# Patient Record
Sex: MALE | Race: WHITE | NOT HISPANIC OR LATINO | Employment: OTHER | URBAN - METROPOLITAN AREA
[De-identification: names, ages, dates, MRNs, and addresses within clinical notes are randomized per-mention and may not be internally consistent; named-entity substitution may affect disease eponyms.]

---

## 2017-02-13 ENCOUNTER — APPOINTMENT (OUTPATIENT)
Dept: RADIOLOGY | Facility: MEDICAL CENTER | Age: 77
End: 2017-02-13
Attending: EMERGENCY MEDICINE
Payer: MEDICARE

## 2017-02-13 ENCOUNTER — HOSPITAL ENCOUNTER (EMERGENCY)
Facility: MEDICAL CENTER | Age: 77
End: 2017-02-13
Attending: EMERGENCY MEDICINE
Payer: MEDICARE

## 2017-02-13 VITALS
HEART RATE: 85 BPM | BODY MASS INDEX: 35.06 KG/M2 | TEMPERATURE: 98.6 F | HEIGHT: 73 IN | WEIGHT: 264.55 LBS | OXYGEN SATURATION: 93 % | SYSTOLIC BLOOD PRESSURE: 118 MMHG | RESPIRATION RATE: 18 BRPM | DIASTOLIC BLOOD PRESSURE: 55 MMHG

## 2017-02-13 DIAGNOSIS — N20.0 NEPHROLITHIASIS: ICD-10-CM

## 2017-02-13 DIAGNOSIS — N39.0 URINARY TRACT INFECTION WITH HEMATURIA, SITE UNSPECIFIED: ICD-10-CM

## 2017-02-13 DIAGNOSIS — R31.9 URINARY TRACT INFECTION WITH HEMATURIA, SITE UNSPECIFIED: ICD-10-CM

## 2017-02-13 LAB
ALBUMIN SERPL BCP-MCNC: 3 G/DL (ref 3.2–4.9)
ALBUMIN/GLOB SERPL: 1.1 G/DL
ALP SERPL-CCNC: 65 U/L (ref 30–99)
ALT SERPL-CCNC: 12 U/L (ref 2–50)
ANION GAP SERPL CALC-SCNC: 11 MMOL/L (ref 0–11.9)
APPEARANCE UR: ABNORMAL
AST SERPL-CCNC: 17 U/L (ref 12–45)
BACTERIA #/AREA URNS HPF: ABNORMAL /HPF
BASOPHILS # BLD AUTO: 0.9 % (ref 0–1.8)
BASOPHILS # BLD: 0.09 K/UL (ref 0–0.12)
BILIRUB SERPL-MCNC: 0.9 MG/DL (ref 0.1–1.5)
BILIRUB UR QL STRIP.AUTO: NEGATIVE
BUN SERPL-MCNC: 16 MG/DL (ref 8–22)
CALCIUM SERPL-MCNC: 8.4 MG/DL (ref 8.4–10.2)
CHLORIDE SERPL-SCNC: 99 MMOL/L (ref 96–112)
CO2 SERPL-SCNC: 26 MMOL/L (ref 20–33)
COLOR UR: YELLOW
CREAT SERPL-MCNC: 1.48 MG/DL (ref 0.5–1.4)
CULTURE IF INDICATED INDCX: YES UA CULTURE
EOSINOPHIL # BLD AUTO: 0.13 K/UL (ref 0–0.51)
EOSINOPHIL NFR BLD: 1.4 % (ref 0–6.9)
ERYTHROCYTE [DISTWIDTH] IN BLOOD BY AUTOMATED COUNT: 50 FL (ref 35.9–50)
GFR SERPL CREATININE-BSD FRML MDRD: 46 ML/MIN/1.73 M 2
GLOBULIN SER CALC-MCNC: 2.8 G/DL (ref 1.9–3.5)
GLUCOSE SERPL-MCNC: 108 MG/DL (ref 65–99)
GLUCOSE UR STRIP.AUTO-MCNC: NEGATIVE MG/DL
HCT VFR BLD AUTO: 50 % (ref 42–52)
HGB BLD-MCNC: 16.7 G/DL (ref 14–18)
IMM GRANULOCYTES # BLD AUTO: 0.06 K/UL (ref 0–0.11)
IMM GRANULOCYTES NFR BLD AUTO: 0.6 % (ref 0–0.9)
KETONES UR STRIP.AUTO-MCNC: NEGATIVE MG/DL
LEUKOCYTE ESTERASE UR QL STRIP.AUTO: ABNORMAL
LIPASE SERPL-CCNC: 42 U/L (ref 7–58)
LYMPHOCYTES # BLD AUTO: 1.43 K/UL (ref 1–4.8)
LYMPHOCYTES NFR BLD: 14.9 % (ref 22–41)
MCH RBC QN AUTO: 26.8 PG (ref 27–33)
MCHC RBC AUTO-ENTMCNC: 33.4 G/DL (ref 33.7–35.3)
MCV RBC AUTO: 80.3 FL (ref 81.4–97.8)
MICRO URNS: ABNORMAL
MONOCYTES # BLD AUTO: 1.27 K/UL (ref 0–0.85)
MONOCYTES NFR BLD AUTO: 13.3 % (ref 0–13.4)
MUCOUS THREADS #/AREA URNS HPF: ABNORMAL /HPF
NEUTROPHILS # BLD AUTO: 6.59 K/UL (ref 1.82–7.42)
NEUTROPHILS NFR BLD: 68.9 % (ref 44–72)
NITRITE UR QL STRIP.AUTO: NEGATIVE
NRBC # BLD AUTO: 0 K/UL
NRBC BLD AUTO-RTO: 0 /100 WBC
PH UR STRIP.AUTO: 8 [PH]
PLATELET # BLD AUTO: 246 K/UL (ref 164–446)
PMV BLD AUTO: 9.3 FL (ref 9–12.9)
POTASSIUM SERPL-SCNC: 3.4 MMOL/L (ref 3.6–5.5)
PROT SERPL-MCNC: 5.8 G/DL (ref 6–8.2)
PROT UR QL STRIP: NEGATIVE MG/DL
RBC # BLD AUTO: 6.23 M/UL (ref 4.7–6.1)
RBC # URNS HPF: ABNORMAL /HPF
RBC UR QL AUTO: ABNORMAL
SODIUM SERPL-SCNC: 136 MMOL/L (ref 135–145)
SP GR UR STRIP.AUTO: 1.01
UNIDENT CRYS URNS QL MICRO: ABNORMAL /HPF
WBC # BLD AUTO: 9.6 K/UL (ref 4.8–10.8)
WBC #/AREA URNS HPF: ABNORMAL /HPF

## 2017-02-13 PROCEDURE — 87086 URINE CULTURE/COLONY COUNT: CPT

## 2017-02-13 PROCEDURE — 74177 CT ABD & PELVIS W/CONTRAST: CPT

## 2017-02-13 PROCEDURE — 87077 CULTURE AEROBIC IDENTIFY: CPT

## 2017-02-13 PROCEDURE — 83690 ASSAY OF LIPASE: CPT

## 2017-02-13 PROCEDURE — 700117 HCHG RX CONTRAST REV CODE 255: Performed by: EMERGENCY MEDICINE

## 2017-02-13 PROCEDURE — 87186 SC STD MICRODIL/AGAR DIL: CPT

## 2017-02-13 PROCEDURE — 96375 TX/PRO/DX INJ NEW DRUG ADDON: CPT

## 2017-02-13 PROCEDURE — 85025 COMPLETE CBC W/AUTO DIFF WBC: CPT

## 2017-02-13 PROCEDURE — 304561 HCHG STAT O2

## 2017-02-13 PROCEDURE — 99285 EMERGENCY DEPT VISIT HI MDM: CPT

## 2017-02-13 PROCEDURE — 96365 THER/PROPH/DIAG IV INF INIT: CPT

## 2017-02-13 PROCEDURE — 81001 URINALYSIS AUTO W/SCOPE: CPT

## 2017-02-13 PROCEDURE — 80053 COMPREHEN METABOLIC PANEL: CPT

## 2017-02-13 PROCEDURE — 36415 COLL VENOUS BLD VENIPUNCTURE: CPT

## 2017-02-13 PROCEDURE — 700111 HCHG RX REV CODE 636 W/ 250 OVERRIDE (IP): Performed by: EMERGENCY MEDICINE

## 2017-02-13 PROCEDURE — 700105 HCHG RX REV CODE 258: Performed by: EMERGENCY MEDICINE

## 2017-02-13 RX ORDER — CEFTRIAXONE 2 G/1
2 INJECTION, POWDER, FOR SOLUTION INTRAMUSCULAR; INTRAVENOUS ONCE
Status: COMPLETED | OUTPATIENT
Start: 2017-02-13 | End: 2017-02-13

## 2017-02-13 RX ORDER — CEFDINIR 300 MG/1
300 CAPSULE ORAL 2 TIMES DAILY
Qty: 20 CAP | Refills: 0 | Status: ON HOLD | OUTPATIENT
Start: 2017-02-13 | End: 2017-11-08

## 2017-02-13 RX ORDER — SODIUM CHLORIDE 9 MG/ML
1000 INJECTION, SOLUTION INTRAVENOUS ONCE
Status: COMPLETED | OUTPATIENT
Start: 2017-02-13 | End: 2017-02-13

## 2017-02-13 RX ORDER — ONDANSETRON 2 MG/ML
4 INJECTION INTRAMUSCULAR; INTRAVENOUS ONCE
Status: COMPLETED | OUTPATIENT
Start: 2017-02-13 | End: 2017-02-13

## 2017-02-13 RX ORDER — ONDANSETRON 4 MG/1
4 TABLET, FILM COATED ORAL EVERY 4 HOURS PRN
Qty: 20 TAB | Refills: 0 | Status: ON HOLD | OUTPATIENT
Start: 2017-02-13 | End: 2017-11-08

## 2017-02-13 RX ADMIN — ONDANSETRON 4 MG: 2 INJECTION, SOLUTION INTRAMUSCULAR; INTRAVENOUS at 16:00

## 2017-02-13 RX ADMIN — IOHEXOL 100 ML: 350 INJECTION, SOLUTION INTRAVENOUS at 17:25

## 2017-02-13 RX ADMIN — CEFTRIAXONE SODIUM 2 G: 2 INJECTION, POWDER, FOR SOLUTION INTRAMUSCULAR; INTRAVENOUS at 18:06

## 2017-02-13 RX ADMIN — SODIUM CHLORIDE 1000 ML: 9 INJECTION, SOLUTION INTRAVENOUS at 16:01

## 2017-02-13 NOTE — ED AVS SNAPSHOT
2/13/2017          Jed Cramer  [address] [facility] [address]    Dear Jed:    [facility] wants to ensure your discharge home is safe and you or your loved ones have had all your questions answered regarding your care after you leave the hospital.    You may receive a telephone call within two days of your discharge.  This call is to make certain you understand your discharge instructions as well as ensure we provided you with the best care possible during your stay with us.     The call will only last approximately 3-5 minutes and will be done by a nurse.    Once again, we want to ensure your discharge home is safe and that you have a clear understanding of any next steps in your care.  If you have any questions or concerns, please do not hesitate to contact us, we are here for you.  Thank you for choosing [facility] for your healthcare needs.    Sincerely,    Brandon England    [facility]

## 2017-02-13 NOTE — ED PROVIDER NOTES
ED Provider Note    CHIEF COMPLAINT  Chief Complaint   Patient presents with   • Abdominal Pain       HPI  Jed Cramer is a 76 y.o. male who presents for evaluation of abrupt onset diffuse abdominal pain mild distention nausea vomiting without diarrhea. He does have a history of bladder cancer, he performs self-catheterization into his neobladder. He denies any vomiting blood and black or bloody stools. No high fevers or chills. He has history of prior appendectomy as well as colitis in the past. He has a pain pump as well. He is followed by urology. No associated hematemesis hematochezia. Currently as 7 out of 10 discomfort primarily epigastric no chest pain no cough or leg swelling. Nothing makes it better or worse    REVIEW OF SYSTEMS  See HPI for further details. No night sweats weight loss numbness tingling or weakness All other systems are negative.     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 5/8/2012   • Personal history of urinary calculi 5/8/2012   • Obesity, unspecified 5/8/2012   • Obstructive sleep apnea (adult) (pediatric) 5/8/2012     unclear if true   • Opioid type dependence, unspecified (CMS-HCC) 5/8/2012   • Leg DVT (deep venous thromboembolism), acute (CMS-HCC) 1980s   • Knee joint injury 1970s     Injury in training for [employer]   • Colitis      Hospitalized 3/2012   • CRPS (complex regional pain syndrome)      Of L leg after surgery 2011   • Chronic pain syndrome      Implanted pain pump managed by Dr Shane Pena   • Depression    • Malignant neoplasm of bladder, part unspecified (CMS-HCC) 2010     Radical cystectomy with prostatectomy, T2N0M0 with  orthotopic neobladder, 2010   • Osteoporosis        FAMILY HISTORY  Noncontributory    SOCIAL HISTORY  Social History     Social History   • Marital Status:      Spouse Name: Mari Cramer   • Number of Children: 3   • Years of Education: N/A     Occupational History   • retired      police   •       / [place]     Social History Main Topics   • Smoking status: Former Smoker -- 1.00 packs/day for 10 years     Quit date: 06/26/2016   • Smokeless tobacco: Current User      Comment: chewing tobacco occ   • Alcohol Use: No   • Drug Use: No   • Sexual Activity: Not Asked     Other Topics Concern   • None     Social History Narrative    , wife Mari    Disability from knee/leg injury    [place] Vet    Retired [employer]     [place] [address]             Former smoker no IV drug use  SURGICAL HISTORY  Past Surgical History   Procedure Laterality Date   • Cystoprostatectomy radical       Orthotopic neobladder construction with Bell 9/2010   • Appendectomy     • Carpal tunnel release     • Tonsillectomy     • Ankle ligament reconstruction       x 4 surgeries   • Rotator cuff repair  9/2003     R side   • Bladder biopsy  2010   • Pump insert/remove  2012     Pain pump   • Athroplasty       23 L  knee surgeries -- 1997 replacement became infected including MRSA, total knee reconstruction done 8/2011 with no infection afterwards but recurrent sx of pain/swelling/redness -- Dr Chrissy Guo Mark [phone] - now followed by Dr Ricardo Mead and Dmitriy Treviño in [place]       CURRENT MEDICATIONS  No current facility-administered medications for this encounter.    Current outpatient prescriptions:   •  denosumab (PROLIA) 60 MG/ML Solution, Inject 60 mg as instructed Once., Disp: , Rfl:   •  aspirin (ASA) 325 MG Tab, Take 325 mg by mouth every 6 hours as needed., Disp: , Rfl:   •  Cholecalciferol (VITAMIN D3) 5000 UNITS Tab, Take 5,000 Units by mouth every day., Disp: 30 Tab, Rfl:   •  duloxetine (CYMBALTA) 60  "MG Cap DR Particles delayed-release capsule, TAKE ONE CAPSULE BY MOUTH DAILY, Disp: 90 Cap, Rfl: 0  •  benazepril (LOTENSIN) 40 MG tablet, Take 1 Tab by mouth every day., Disp: 90 Tab, Rfl: 2  •  ondansetron (ZOFRAN) 4 MG TABS tablet, Take 1 Tab by mouth every four hours as needed for Nausea/Vomiting., Disp: 16 Tab, Rfl: 2  •  sodium bicarbonate (SODIUM BICARBONATE) 650 MG TABS, Take 2 Tabs by mouth 3 times a day., Disp: 120 Tab, Rfl: 3  •  oxycodone immediate-release (ROXICODONE) 5 MG TABS, Take 15 mg by mouth every four hours as needed., Disp: , Rfl:       ALLERGIES  Allergies   Allergen Reactions   • Ciprofloxacin Hcl      Wife states it causes c-diff       PHYSICAL EXAM  VITAL SIGNS: /98 mmHg  Pulse 71  Temp(Src) 37 °C (98.6 °F)  Resp 20  Ht 1.854 m (6' 0.99\")  Wt 120 kg (264 lb 8.8 oz)  BMI 34.91 kg/m2 Room air O2: 95    Constitutional: Patient is uncomfortable  HENT: Normocephalic, Atraumatic, Bilateral external ears normal, Oropharynx moist, No oral exudates, Nose normal.   Eyes: PERRLA, EOMI, Conjunctiva normal, No discharge.   Neck: Normal range of motion, No tenderness, Supple, No stridor.   Lymphatic: No lymphadenopathy noted.   Cardiovascular: Normal heart rate, Normal rhythm, No murmurs, No rubs, No gallops.   Thorax & Lungs: Normal breath sounds, No respiratory distress, No wheezing, No chest tenderness.   Abdomen: Bowel sounds normal, Soft, she is mild tenderness there does appear to be an umbilical hernia which is reducible. Moderate distention is noted. Pain pump noted in the left anterior lower abdominal wall. No rebound guarding or rigidity  Skin: Warm, Dry, No erythema, No rash.   Back: No tenderness, No CVA tenderness.   Extremities: Intact distal pulses, No edema, No tenderness, No cyanosis, No clubbing.   Neurologic: Alert & oriented x 3, Normal motor function, Normal sensory function, No focal deficits noted.   Psychiatric: Affect normal, Judgment normal, Mood normal. "       RADIOLOGY/PROCEDURES  CT-ABDOMEN-PELVIS WITH   Final Result      1.4 cm calculus within the urinary conduit. Mild wall thickening of the conduit is noted. Correlation with urinalysis is recommended.      Bilateral nonobstructing renal calculi. A 6 mm calculus is seen in the right renal pelvis.      Scarring of the upper pole of the right kidney is noted. Bilateral renal cysts.      Hypodense hepatic lesions are indeterminate. Further evaluation can be obtained with ultrasound or hepatic protocol CT.      Colonic diverticulosis. Moderate amount of colonic stool.      Healing fractures of right-sided ribs.               COURSE & MEDICAL DECISION MAKING  Pertinent Labs & Imaging studies reviewed. (See chart for details)  Results for orders placed or performed during the hospital encounter of 02/13/17   CBC WITH DIFFERENTIAL   Result Value Ref Range    WBC 9.6 4.8 - 10.8 K/uL    RBC 6.23 (H) 4.70 - 6.10 M/uL    Hemoglobin 16.7 14.0 - 18.0 g/dL    Hematocrit 50.0 42.0 - 52.0 %    MCV 80.3 (L) 81.4 - 97.8 fL    MCH 26.8 (L) 27.0 - 33.0 pg    MCHC 33.4 (L) 33.7 - 35.3 g/dL    RDW 50.0 35.9 - 50.0 fL    Platelet Count 246 164 - 446 K/uL    MPV 9.3 9.0 - 12.9 fL    Neutrophils-Polys 68.90 44.00 - 72.00 %    Lymphocytes 14.90 (L) 22.00 - 41.00 %    Monocytes 13.30 0.00 - 13.40 %    Eosinophils 1.40 0.00 - 6.90 %    Basophils 0.90 0.00 - 1.80 %    Immature Granulocytes 0.60 0.00 - 0.90 %    Nucleated RBC 0.00 /100 WBC    Neutrophils (Absolute) 6.59 1.82 - 7.42 K/uL    Lymphs (Absolute) 1.43 1.00 - 4.80 K/uL    Monos (Absolute) 1.27 (H) 0.00 - 0.85 K/uL    Eos (Absolute) 0.13 0.00 - 0.51 K/uL    Baso (Absolute) 0.09 0.00 - 0.12 K/uL    Immature Granulocytes (abs) 0.06 0.00 - 0.11 K/uL    NRBC (Absolute) 0.00 K/uL   COMP METABOLIC PANEL   Result Value Ref Range    Sodium 136 135 - 145 mmol/L    Potassium 3.4 (L) 3.6 - 5.5 mmol/L    Chloride 99 96 - 112 mmol/L    Co2 26 20 - 33 mmol/L    Anion Gap 11.0 0.0 - 11.9    Glucose  108 (H) 65 - 99 mg/dL    Bun 16 8 - 22 mg/dL    Creatinine 1.48 (H) 0.50 - 1.40 mg/dL    Calcium 8.4 8.4 - 10.2 mg/dL    AST(SGOT) 17 12 - 45 U/L    ALT(SGPT) 12 2 - 50 U/L    Alkaline Phosphatase 65 30 - 99 U/L    Total Bilirubin 0.9 0.1 - 1.5 mg/dL    Albumin 3.0 (L) 3.2 - 4.9 g/dL    Total Protein 5.8 (L) 6.0 - 8.2 g/dL    Globulin 2.8 1.9 - 3.5 g/dL    A-G Ratio 1.1 g/dL   LIPASE   Result Value Ref Range    Lipase 42 7 - 58 U/L   URINALYSIS,CULTURE IF INDICATED   Result Value Ref Range    Micro Urine Req Microscopic     Color Yellow     Character Sl Cloudy (A)     Specific Gravity 1.010 <1.035    Ph 8.0 5.0-8.0    Glucose Negative Negative mg/dL    Ketones Negative Negative mg/dL    Protein Negative Negative mg/dL    Bilirubin Negative Negative    Nitrite Negative Negative    Leukocyte Esterase Small (A) Negative    Occult Blood Trace (A) Negative    Culture Indicated Yes UA Culture   ESTIMATED GFR   Result Value Ref Range    GFR If  56 (A) >60 mL/min/1.73 m 2    GFR If Non  46 (A) >60 mL/min/1.73 m 2   URINE MICROSCOPIC (W/UA)   Result Value Ref Range    WBC 5-10 (A) /hpf    RBC 0-2 (A) /hpf    Bacteria Rare (A) None /hpf    Mucous Threads Few /hpf    Urine Crystals Few Amorphous /hpf      IV was established. The patient was given IV fluids and nausea medication as well as antibiotics. He does have evidence of likely mild urinary tract infection. No leukocytosis bandemia tachycardia high fever or chills to suggest sepsis. CT scan here demonstrates what appears to be a relatively large stone in his neobladder. It does not appear to be obstructing this could very well accounted for his pain. He does not have any evidence of obstruction here. He is followed by a urologist in [place] Dr. Bell. I will start him on Omnicef he was given IV Rocephin here. I will give him copies of his laboratory studies and CT scan for follow-up with his urologist    FINAL IMPRESSION  1.  Nephrolithiasis  2. UTI         Electronically signed by: John Paul Ag, 2/13/2017 3:17 PM

## 2017-02-13 NOTE — ED AVS SNAPSHOT
Home Care Instructions                                                                                                                Jed Cramer   MRN: [id number]    Department:  [facility], Emergency Dept   Date of Visit:  2/13/2017            [facility], Emergency Dept    [address]    [place] [address]    Phone:  [phone]      You were seen by     John Paul Ag M.D.      Your Diagnosis Was     Urinary tract infection with hematuria, site unspecified     N39.0, R31.9       These are the medications you received during your hospitalization from 02/13/2017 1458 to 02/13/2017 1844     Date/Time Order Dose Route Action    02/13/2017 1601 NS infusion 1,000 mL 1,000 mL Intravenous New Bag    02/13/2017 1600 ondansetron (ZOFRAN) syringe/vial injection 4 mg 4 mg Intravenous Given    02/13/2017 1725 iohexol (OMNIPAQUE) 350 mg/mL 100 mL Intravenous Given    02/13/2017 1806 cefTRIAXone (ROCEPHIN) injection 2 g 2 g Intravenous Given      Follow-up Information     1. Follow up with Shane Bell M.D. In 2 days.    Specialty:  Urology    Why:  As needed, If symptoms worsen    Contact information    [address] [facility] [address]  [phone]        Medication Information     Review all of your home medications and newly ordered medications with your primary doctor and/or pharmacist as soon as possible. Follow medication instructions as directed by your doctor and/or pharmacist.     Please keep your complete medication list with you and share with your physician. Update the information when medications are discontinued, doses are changed, or new medications (including over-the-counter products) are added; and carry medication information at all times in the event of emergency situations.               Medication List      START taking these medications        Instructions    cefdinir 300 MG Caps   Commonly known as:  OMNICEF    Take 1 Cap by mouth 2  times a day.   Dose:  300 mg         ASK your doctor about these medications        Instructions    aspirin 325 MG Tabs   Commonly known as:  ASA    Take 325 mg by mouth every 6 hours as needed.   Dose:  325 mg       benazepril 40 MG tablet   Commonly known as:  LOTENSIN    Take 1 Tab by mouth every day.   Dose:  40 mg       duloxetine 60 MG Cpep delayed-release capsule   Commonly known as:  CYMBALTA    TAKE ONE CAPSULE BY MOUTH DAILY       * ondansetron 4 MG Tabs tablet   What changed:  Another medication with the same name was added. Make sure you understand how and when to take each.   Commonly known as:  ZOFRAN   Ask about: Which instructions should I use?    Take 1 Tab by mouth every four hours as needed for Nausea/Vomiting.   Dose:  4 mg       * ondansetron 4 MG Tabs tablet   What changed:  You were already taking a medication with the same name, and this prescription was added. Make sure you understand how and when to take each.   Commonly known as:  ZOFRAN   Ask about: Which instructions should I use?    Take 1 Tab by mouth every four hours as needed for Nausea/Vomiting.   Dose:  4 mg       oxycodone immediate-release 5 MG Tabs   Commonly known as:  ROXICODONE    Take 15 mg by mouth every four hours as needed.   Dose:  15 mg       PROLIA 60 MG/ML Soln   Generic drug:  denosumab    Inject 60 mg as instructed Once.   Dose:  60 mg       sodium bicarbonate 650 MG Tabs   Commonly known as:  SODIUM BICARBONATE    Take 2 Tabs by mouth 3 times a day.   Dose:  1300 mg       Vitamin D3 5000 UNITS Tabs    Take 5,000 Units by mouth every day.   Dose:  5000 Units       * Notice:  This list has 2 medication(s) that are the same as other medications prescribed for you. Read the directions carefully, and ask your doctor or other care provider to review them with you.            Procedures and tests performed during your visit     CBC WITH DIFFERENTIAL    COMP METABOLIC PANEL    CONSENT FOR CONTRAST INJECTION     CT-ABDOMEN-PELVIS WITH    ESTIMATED GFR    LIPASE    URINALYSIS,CULTURE IF INDICATED    URINE CULTURE(NEW)    URINE MICROSCOPIC (W/UA)        Discharge Instructions       Urinary Tract Infection  Urinary tract infections (UTIs) can develop anywhere along your urinary tract. Your urinary tract is your body's drainage system for removing wastes and extra water. Your urinary tract includes two kidneys, two ureters, a bladder, and a urethra. Your kidneys are a pair of bean-shaped organs. Each kidney is about the size of your fist. They are located below your ribs, one on each side of your spine.  CAUSES  Infections are caused by microbes, which are microscopic organisms, including fungi, viruses, and bacteria. These organisms are so small that they can only be seen through a microscope. Bacteria are the microbes that most commonly cause UTIs.  SYMPTOMS   Symptoms of UTIs may vary by age and gender of the patient and by the location of the infection. Symptoms in young women typically include a frequent and intense urge to urinate and a painful, burning feeling in the bladder or urethra during urination. Older women and men are more likely to be tired, shaky, and weak and have muscle aches and abdominal pain. A fever may mean the infection is in your kidneys. Other symptoms of a kidney infection include pain in your back or sides below the ribs, nausea, and vomiting.  DIAGNOSIS  To diagnose a UTI, your caregiver will ask you about your symptoms. Your caregiver also will ask to provide a urine sample. The urine sample will be tested for bacteria and white blood cells. White blood cells are made by your body to help fight infection.  TREATMENT   Typically, UTIs can be treated with medication. Because most UTIs are caused by a bacterial infection, they usually can be treated with the use of antibiotics. The choice of antibiotic and length of treatment depend on your symptoms and the type of bacteria causing your  infection.  HOME CARE INSTRUCTIONS  · If you were prescribed antibiotics, take them exactly as your caregiver instructs you. Finish the medication even if you feel better after you have only taken some of the medication.  · Drink enough water and fluids to keep your urine clear or pale yellow.  · Avoid caffeine, tea, and carbonated beverages. They tend to irritate your bladder.  · Empty your bladder often. Avoid holding urine for long periods of time.  · Empty your bladder before and after sexual intercourse.  · After a bowel movement, women should cleanse from front to back. Use each tissue only once.  SEEK MEDICAL CARE IF:   · You have back pain.  · You develop a fever.  · Your symptoms do not begin to resolve within 3 days.  SEEK IMMEDIATE MEDICAL CARE IF:   · You have severe back pain or lower abdominal pain.  · You develop chills.  · You have nausea or vomiting.  · You have continued burning or discomfort with urination.  MAKE SURE YOU:   · Understand these instructions.  · Will watch your condition.  · Will get help right away if you are not doing well or get worse.     This information is not intended to replace advice given to you by your health care provider. Make sure you discuss any questions you have with your health care provider.     Document Released: 09/27/2006 Document Revised: 01/08/2016 Document Reviewed: 01/25/2013  [employer] Interactive Patient Education ©2016 [employer]  Kidney Stones  Kidney stones (urolithiasis) are deposits that form inside your kidneys. The intense pain is caused by the stone moving through the urinary tract. When the stone moves, the ureter goes into spasm around the stone. The stone is usually passed in the urine.   CAUSES   · A disorder that makes certain neck glands produce too much parathyroid hormone (primary hyperparathyroidism).  · A buildup of uric acid crystals, similar to gout in your joints.  · Narrowing (stricture) of the ureter.  · A kidney obstruction  present at birth (congenital obstruction).  · Previous surgery on the kidney or ureters.  · Numerous kidney infections.  SYMPTOMS   · Feeling sick to your stomach (nauseous).  · Throwing up (vomiting).  · Blood in the urine (hematuria).  · Pain that usually spreads (radiates) to the groin.  · Frequency or urgency of urination.  DIAGNOSIS   · Taking a history and physical exam.  · Blood or urine tests.  · CT scan.  · Occasionally, an examination of the inside of the urinary bladder (cystoscopy) is performed.  TREATMENT   · Observation.  · Increasing your fluid intake.  · Extracorporeal shock wave lithotripsy--This is a noninvasive procedure that uses shock waves to break up kidney stones.  · Surgery may be needed if you have severe pain or persistent obstruction. There are various surgical procedures. Most of the procedures are performed with the use of small instruments. Only small incisions are needed to accommodate these instruments, so recovery time is minimized.  The size, location, and chemical composition are all important variables that will determine the proper choice of action for you. Talk to your health care provider to better understand your situation so that you will minimize the risk of injury to yourself and your kidney.   HOME CARE INSTRUCTIONS   · Drink enough water and fluids to keep your urine clear or pale yellow. This will help you to pass the stone or stone fragments.  · Strain all urine through the provided strainer. Keep all particulate matter and stones for your health care provider to see. The stone causing the pain may be as small as a grain of salt. It is very important to use the strainer each and every time you pass your urine. The collection of your stone will allow your health care provider to analyze it and verify that a stone has actually passed. The stone analysis will often identify what you can do to reduce the incidence of recurrences.  · Only take over-the-counter or  prescription medicines for pain, discomfort, or fever as directed by your health care provider.  · Make a follow-up appointment with your health care provider as directed.  · Get follow-up X-rays if required. The absence of pain does not always mean that the stone has passed. It may have only stopped moving. If the urine remains completely obstructed, it can cause loss of kidney function or even complete destruction of the kidney. It is your responsibility to make sure X-rays and follow-ups are completed. Ultrasounds of the kidney can show blockages and the status of the kidney. Ultrasounds are not associated with any radiation and can be performed easily in a matter of minutes.  SEEK MEDICAL CARE IF:  · You experience pain that is progressive and unresponsive to any pain medicine you have been prescribed.  SEEK IMMEDIATE MEDICAL CARE IF:   · Pain cannot be controlled with the prescribed medicine.  · You have a fever or shaking chills.  · The severity or intensity of pain increases over 18 hours and is not relieved by pain medicine.  · You develop a new onset of abdominal pain.  · You feel faint or pass out.  · You are unable to urinate.  MAKE SURE YOU:   · Understand these instructions.  · Will watch your condition.  · Will get help right away if you are not doing well or get worse.     This information is not intended to replace advice given to you by your health care provider. Make sure you discuss any questions you have with your health care provider.     Document Released: 12/18/2006 Document Revised: 01/08/2016 Document Reviewed: 05/21/2014  [employer] Interactive Patient Education ©2016 [employer]            Patient Information     Patient Information    Following emergency treatment: all patient requiring follow-up care must return either to a private physician or a clinic if your condition worsens before you are able to obtain further medical attention, please return to the emergency room.     Billing  Information    At [facility], we work to make the billing process streamlined for our patients.  Our Representatives are here to answer any questions you may have regarding your hospital bill.  If you have insurance coverage and have supplied your insurance information to us, we will submit a claim to your insurer on your behalf.  Should you have any questions regarding your bill, we can be reached online or by phone as follows:  Online: You are able pay your bills online or live chat with our representatives about any billing questions you may have. We are here to help Monday - Friday from 8:00am to 7:30pm and 9:00am - 12:00pm on Saturdays.  Please visit [email or web address]  for more information.   Phone:  [phone] or [phone]    Please note that your emergency physician, surgeon, pathologist, radiologist, anesthesiologist, and other specialists are not employed by [facility] and will therefore bill separately for their services.  Please contact them directly for any questions concerning their bills at the numbers below:     Emergency Physician Services:  [phone]  [facility]:  [phone]  [facility]:  [phone]  [facility]:  [phone]    1. Your final bill may vary from the amount quoted upon discharge if all procedures are not complete at that time, or if your doctor has additional procedures of which we are not aware. You will receive an additional bill if you return to the Emergency Department at [facility] for suture removal regardless of the facility of which the sutures were placed.     2. Please arrange for settlement of this account at the emergency registration.    3. All self-pay accounts are due in full at the time of treatment.  If you are unable to meet this obligation then payment is expected within 4-5 days.     4. If you have had radiology studies (CT, X-ray, Ultrasound, MRI), you have  received a preliminary result during your emergency department visit. Please contact the radiology department [phone] to receive a copy of your final result. Please discuss the Final result with your primary physician or with the follow up physician provided.     Crisis Hotline:  [place] Crisis Hotline:  8-055-ZYZDSOA or 1-930.631.2099  [place] Crisis Hotline:    1-784.172.4572 or 867-902-1273         ED Discharge Follow Up Questions    1. In order to provide you with very good care, we would like to follow up with a phone call in the next few days.  May we have your permission to contact you?     YES /  NO    2. What is the best phone number to call you? (       )_____-__________    3. What is the best time to call you?      Morning  /  Afternoon  /  Evening                   Patient Signature:  ____________________________________________________________    Date:  ____________________________________________________________

## 2017-02-13 NOTE — ED AVS SNAPSHOT
Relayware Access Code: [email or web address]  Expires: 3/15/2017  6:22 PM    Relayware  A secure, online tool to manage your health information     [employer]’s Relayware® is a secure, online tool that connects you to your personalized health information from the privacy of your home -- day or night - making it very easy for you to manage your healthcare. Once the activation process is completed, you can even access your medical information using the Relayware ally, which is available for free in the Apple Ally store or Google Play store.     Relayware provides the following levels of access (as shown below):   My Chart Features   [facility] Primary Care Doctor [facility]  Specialists [facility]  Urgent  Care Non-[facility]  Primary Care  Doctor   Email your healthcare team securely and privately 24/7 X X X X   Manage appointments: schedule your next appointment; view details of past/upcoming appointments X      Request prescription refills. X      View recent personal medical records, including lab and immunizations X X X X   View health record, including health history, allergies, medications X X X X   Read reports about your outpatient visits, procedures, consult and ER notes X X X X   See your discharge summary, which is a recap of your hospital and/or ER visit that includes your diagnosis, lab results, and care plan. X X       How to register for Relayware:  1. Go to  [email or web address].  2. Click on the Sign Up Now box, which takes you to the New Member Sign Up page. You will need to provide the following information:  a. Enter your Relayware Access Code exactly as it appears at the top of this page. (You will not need to use this code after you’ve completed the sign-up process. If you do not sign up before the expiration date, you must request a new code.)   b. Enter your date of birth.   c. Enter your home email address.   d. Click Submit, and follow the next screen’s instructions.  3. Create a Relayware ID. This will be your Relayware  login ID and cannot be changed, so think of one that is secure and easy to remember.  4. Create a [employer] password. You can change your password at any time.  5. Enter your Password Reset Question and Answer. This can be used at a later time if you forget your password.   6. Enter your e-mail address. This allows you to receive e-mail notifications when new information is available in [employer].  7. Click Sign Up. You can now view your health information.    For assistance activating your [employer] account, call [phone]

## 2017-02-13 NOTE — LETTER
2/15/2017               Jed SAVI Cramer  [address] [place] [id number]        Dear Jed (MR#[id number])    This letter is sent in regards to your, recent visit to the [facility] Emergency Department on 2/13/2017.  During the visit, tests were performed to assist the physician in a medical diagnosis.  A review of those tests requires that we notify you of the following:    Your urine culture was POSITIVE for a bacteria called Proteus mirabilis. The antibiotic prescribed for you (cefdinir) should be active to treat this bacteria. IT IS IMPORTANT THAT YOU CONTINUE TAKING YOUR ANTIBIOTIC UNTIL IT IS FINISHED.      Please feel free to contact me at the number below if you have any questions or concerns. Thank you for your cooperation in the matter.    Sincerely,  ED Culture Follow-Up Staff  Iris La, PharmD  [phone]    [facility], Emergency Department  [address] [place] [id number] [phone] (ED Culture Line)

## 2017-02-14 NOTE — ED NOTES
assisted with care for d/c only. D/c instructions and prescriptions given, pt to f/u with urology, return for worsening s/s

## 2017-02-14 NOTE — ED NOTES
Pt medicated as directed by ER md,  Urine collected  And taken to lab poc update given to pt. Further orders and dispo pending at this time. No further questions from pt at this time

## 2017-02-14 NOTE — DISCHARGE INSTRUCTIONS
Urinary Tract Infection  Urinary tract infections (UTIs) can develop anywhere along your urinary tract. Your urinary tract is your body's drainage system for removing wastes and extra water. Your urinary tract includes two kidneys, two ureters, a bladder, and a urethra. Your kidneys are a pair of bean-shaped organs. Each kidney is about the size of your fist. They are located below your ribs, one on each side of your spine.  CAUSES  Infections are caused by microbes, which are microscopic organisms, including fungi, viruses, and bacteria. These organisms are so small that they can only be seen through a microscope. Bacteria are the microbes that most commonly cause UTIs.  SYMPTOMS   Symptoms of UTIs may vary by age and gender of the patient and by the location of the infection. Symptoms in young women typically include a frequent and intense urge to urinate and a painful, burning feeling in the bladder or urethra during urination. Older women and men are more likely to be tired, shaky, and weak and have muscle aches and abdominal pain. A fever may mean the infection is in your kidneys. Other symptoms of a kidney infection include pain in your back or sides below the ribs, nausea, and vomiting.  DIAGNOSIS  To diagnose a UTI, your caregiver will ask you about your symptoms. Your caregiver also will ask to provide a urine sample. The urine sample will be tested for bacteria and white blood cells. White blood cells are made by your body to help fight infection.  TREATMENT   Typically, UTIs can be treated with medication. Because most UTIs are caused by a bacterial infection, they usually can be treated with the use of antibiotics. The choice of antibiotic and length of treatment depend on your symptoms and the type of bacteria causing your infection.  HOME CARE INSTRUCTIONS  · If you were prescribed antibiotics, take them exactly as your caregiver instructs you. Finish the medication even if you feel better after you  have only taken some of the medication.  · Drink enough water and fluids to keep your urine clear or pale yellow.  · Avoid caffeine, tea, and carbonated beverages. They tend to irritate your bladder.  · Empty your bladder often. Avoid holding urine for long periods of time.  · Empty your bladder before and after sexual intercourse.  · After a bowel movement, women should cleanse from front to back. Use each tissue only once.  SEEK MEDICAL CARE IF:   · You have back pain.  · You develop a fever.  · Your symptoms do not begin to resolve within 3 days.  SEEK IMMEDIATE MEDICAL CARE IF:   · You have severe back pain or lower abdominal pain.  · You develop chills.  · You have nausea or vomiting.  · You have continued burning or discomfort with urination.  MAKE SURE YOU:   · Understand these instructions.  · Will watch your condition.  · Will get help right away if you are not doing well or get worse.     This information is not intended to replace advice given to you by your health care provider. Make sure you discuss any questions you have with your health care provider.     Document Released: 09/27/2006 Document Revised: 01/08/2016 Document Reviewed: 01/25/2013  [employer] Interactive Patient Education ©2016 [employer]  Kidney Stones  Kidney stones (urolithiasis) are deposits that form inside your kidneys. The intense pain is caused by the stone moving through the urinary tract. When the stone moves, the ureter goes into spasm around the stone. The stone is usually passed in the urine.   CAUSES   · A disorder that makes certain neck glands produce too much parathyroid hormone (primary hyperparathyroidism).  · A buildup of uric acid crystals, similar to gout in your joints.  · Narrowing (stricture) of the ureter.  · A kidney obstruction present at birth (congenital obstruction).  · Previous surgery on the kidney or ureters.  · Numerous kidney infections.  SYMPTOMS   · Feeling sick to your stomach (nauseous).  · Throwing  up (vomiting).  · Blood in the urine (hematuria).  · Pain that usually spreads (radiates) to the groin.  · Frequency or urgency of urination.  DIAGNOSIS   · Taking a history and physical exam.  · Blood or urine tests.  · CT scan.  · Occasionally, an examination of the inside of the urinary bladder (cystoscopy) is performed.  TREATMENT   · Observation.  · Increasing your fluid intake.  · Extracorporeal shock wave lithotripsy--This is a noninvasive procedure that uses shock waves to break up kidney stones.  · Surgery may be needed if you have severe pain or persistent obstruction. There are various surgical procedures. Most of the procedures are performed with the use of small instruments. Only small incisions are needed to accommodate these instruments, so recovery time is minimized.  The size, location, and chemical composition are all important variables that will determine the proper choice of action for you. Talk to your health care provider to better understand your situation so that you will minimize the risk of injury to yourself and your kidney.   HOME CARE INSTRUCTIONS   · Drink enough water and fluids to keep your urine clear or pale yellow. This will help you to pass the stone or stone fragments.  · Strain all urine through the provided strainer. Keep all particulate matter and stones for your health care provider to see. The stone causing the pain may be as small as a grain of salt. It is very important to use the strainer each and every time you pass your urine. The collection of your stone will allow your health care provider to analyze it and verify that a stone has actually passed. The stone analysis will often identify what you can do to reduce the incidence of recurrences.  · Only take over-the-counter or prescription medicines for pain, discomfort, or fever as directed by your health care provider.  · Make a follow-up appointment with your health care provider as directed.  · Get follow-up X-rays  if required. The absence of pain does not always mean that the stone has passed. It may have only stopped moving. If the urine remains completely obstructed, it can cause loss of kidney function or even complete destruction of the kidney. It is your responsibility to make sure X-rays and follow-ups are completed. Ultrasounds of the kidney can show blockages and the status of the kidney. Ultrasounds are not associated with any radiation and can be performed easily in a matter of minutes.  SEEK MEDICAL CARE IF:  · You experience pain that is progressive and unresponsive to any pain medicine you have been prescribed.  SEEK IMMEDIATE MEDICAL CARE IF:   · Pain cannot be controlled with the prescribed medicine.  · You have a fever or shaking chills.  · The severity or intensity of pain increases over 18 hours and is not relieved by pain medicine.  · You develop a new onset of abdominal pain.  · You feel faint or pass out.  · You are unable to urinate.  MAKE SURE YOU:   · Understand these instructions.  · Will watch your condition.  · Will get help right away if you are not doing well or get worse.     This information is not intended to replace advice given to you by your health care provider. Make sure you discuss any questions you have with your health care provider.     Document Released: 12/18/2006 Document Revised: 01/08/2016 Document Reviewed: 05/21/2014  [employer] Interactive Patient Education ©2016 [employer]

## 2017-02-15 LAB
BACTERIA UR CULT: ABNORMAL
BACTERIA UR CULT: ABNORMAL
SIGNIFICANT IND 70042: ABNORMAL
SITE SITE: ABNORMAL
SOURCE SOURCE: ABNORMAL

## 2017-02-15 NOTE — ED NOTES
ED Positive Culture Follow-up/Notification Note:    Date: 2/15/17      Patient seen in the ED on 2/13/2017 for:    1. Urinary tract infection with hematuria, site unspecified    2. Nephrolithiasis       Discharge Medication List as of 2/13/2017  6:44 PM      START taking these medications    Details   !! ondansetron (ZOFRAN) 4 MG Tab tablet 4 mg, EVERY 4 HOURS PRN Starting 2/13/2017, Until Discontinued, Disp-20 Tab, R-0, Oral      cefdinir (OMNICEF) 300 MG Cap Take 1 Cap by mouth 2 times a day., Disp-20 Cap, R-0, Print Rx Paper       !! - Potential duplicate medications found. Please discuss with provider.          Allergies: Ciprofloxacin hcl     Final cultures:   Results     Procedure Component Value Units Date/Time    URINE CULTURE(NEW) [[id number]]  (Abnormal)  (Susceptibility) Collected:  02/13/17 1602    Order Status:  Completed Specimen Information:  Urine Updated:  02/15/17 1008     Significant Indicator POS (POS)      Source UR      Site --      Urine Culture Mixed skin steve 10-50,000 cfu/mL (A)      Urine Culture -- (A)      Result:        Proteus mirabilis  ,000 cfu/mL      Culture & Susceptibility     PROTEUS MIRABILIS     Antibiotic Sensitivity Microscan Unit Status    Ampicillin Sensitive <=8 mcg/mL Final    Cefepime Sensitive <=8 mcg/mL Final    Cefotaxime Sensitive <=2 mcg/mL Final    Cefotetan Sensitive <=16 mcg/mL Final    Ceftazidime Sensitive <=1 mcg/mL Final    Ceftriaxone Sensitive <=8 mcg/mL Final    Cefuroxime Sensitive <=4 mcg/mL Final    Cephalothin Sensitive <=8 mcg/mL Final    Ciprofloxacin Sensitive <=1 mcg/mL Final    Gentamicin Sensitive <=4 mcg/mL Final    Levofloxacin Sensitive <=2 mcg/mL Final    Nitrofurantoin Resistant >64 mcg/mL Final    Pip/Tazobactam Sensitive <=16 mcg/mL Final    Piperacillin Sensitive <=16 mcg/mL Final    Tobramycin Sensitive <=4 mcg/mL Final    Trimeth/Sulfa Sensitive <=2/38 mcg/mL Final                       URINALYSIS,CULTURE IF INDICATED [[id number]]   (Abnormal) Collected:  02/13/17 1602    Order Status:  Completed Specimen Information:  Other Updated:  02/13/17 1624     Micro Urine Req Microscopic      Color Yellow      Character Sl Cloudy (A)      Specific Gravity 1.010      Ph 8.0      Glucose Negative mg/dL      Ketones Negative mg/dL      Protein Negative mg/dL      Bilirubin Negative      Nitrite Negative      Leukocyte Esterase Small (A)      Occult Blood Trace (A)      Culture Indicated Yes UA Culture           Plan:   Appropriate antibiotic therapy prescribed. No changes required based upon culture result.  Sent letter to patient to notify of positive culture result and encourage compliance with prescribed antibiotics.     Iris La, PHARMD    Cosigned: Johanna Lopez, SantiagoD, BCPS

## 2017-03-16 ENCOUNTER — HOSPITAL ENCOUNTER (EMERGENCY)
Facility: MEDICAL CENTER | Age: 77
End: 2017-03-16
Payer: MEDICARE

## 2017-03-16 VITALS
WEIGHT: 256 LBS | HEIGHT: 75 IN | OXYGEN SATURATION: 95 % | BODY MASS INDEX: 31.83 KG/M2 | SYSTOLIC BLOOD PRESSURE: 114 MMHG | HEART RATE: 84 BPM | RESPIRATION RATE: 16 BRPM | TEMPERATURE: 98.2 F | DIASTOLIC BLOOD PRESSURE: 53 MMHG

## 2017-03-16 PROCEDURE — 302449 STATCHG TRIAGE ONLY (STATISTIC)

## 2017-03-16 NOTE — ED NOTES
"Chief Complaint   Patient presents with   • N/V   • Abdominal Pain     x 2 weeks, c/o generalized abd pain, described as \"cramping sometimes\"   • Painful Urination     /53 mmHg  Pulse 84  Temp(Src) 36.8 °C (98.2 °F)  Resp 16  Ht 1.905 m (6' 3\")  Wt 116.121 kg (256 lb)  BMI 32.00 kg/m2  SpO2 95%    Pt sent by Dr. Proctor, per family member pt to be worked up for possible Sepsis or UTI  "

## 2017-03-17 NOTE — ED NOTES
Pt came to Triage desk and stated would follow up w/ PCP in AM.  Apologized to pt for wait time, encouraged to wait to be seen by ERP.  Pt agreed to wait.

## 2017-03-17 NOTE — ED NOTES
Pt came to Triage desk and stated would be leaving.  Spouse left to get car.  Pt stated would call Dr. Proctor in am.    Pt denied c/o CP at time of leaving ER.  Pt signed AMA

## 2017-07-26 ENCOUNTER — OFFICE VISIT (OUTPATIENT)
Dept: MEDICAL GROUP | Facility: PHYSICIAN GROUP | Age: 77
End: 2017-07-26
Payer: MEDICARE

## 2017-07-26 VITALS
RESPIRATION RATE: 16 BRPM | SYSTOLIC BLOOD PRESSURE: 120 MMHG | DIASTOLIC BLOOD PRESSURE: 70 MMHG | BODY MASS INDEX: 33.5 KG/M2 | TEMPERATURE: 96.6 F | OXYGEN SATURATION: 97 % | HEIGHT: 70 IN | WEIGHT: 234 LBS | HEART RATE: 68 BPM

## 2017-07-26 DIAGNOSIS — R10.13 EPIGASTRIC PAIN: ICD-10-CM

## 2017-07-26 DIAGNOSIS — I10 ESSENTIAL HYPERTENSION: ICD-10-CM

## 2017-07-26 DIAGNOSIS — G89.4 CHRONIC PAIN SYNDROME: ICD-10-CM

## 2017-07-26 DIAGNOSIS — Z85.51 H/O PRIMARY MALIGNANT NEOPLASM OF URINARY BLADDER: ICD-10-CM

## 2017-07-26 DIAGNOSIS — F41.9 ANXIETY: ICD-10-CM

## 2017-07-26 DIAGNOSIS — I48.0 PAROXYSMAL ATRIAL FIBRILLATION (HCC): ICD-10-CM

## 2017-07-26 DIAGNOSIS — I63.9 CEREBROVASCULAR ACCIDENT (CVA), UNSPECIFIED MECHANISM (HCC): ICD-10-CM

## 2017-07-26 DIAGNOSIS — E66.9 OBESITY (BMI 30-39.9): ICD-10-CM

## 2017-07-26 PROCEDURE — 99204 OFFICE O/P NEW MOD 45 MIN: CPT | Performed by: FAMILY MEDICINE

## 2017-07-26 RX ORDER — TESTOSTERONE CYPIONATE 200 MG/ML
50 INJECTION, SOLUTION INTRAMUSCULAR ONCE
Status: ON HOLD | COMMUNITY
End: 2017-11-08

## 2017-07-26 RX ORDER — FOLIC ACID 1 MG/1
1 TABLET ORAL DAILY
COMMUNITY
End: 2017-08-17 | Stop reason: SDUPTHER

## 2017-07-26 RX ORDER — BUPROPION HYDROCHLORIDE 100 MG/1
100 TABLET ORAL 2 TIMES DAILY
Qty: 60 TAB | Refills: 3 | Status: SHIPPED | OUTPATIENT
Start: 2017-07-26 | End: 2017-08-09

## 2017-07-26 RX ORDER — TRAZODONE HYDROCHLORIDE 50 MG/1
50 TABLET ORAL NIGHTLY
COMMUNITY

## 2017-07-26 ASSESSMENT — ENCOUNTER SYMPTOMS
COUGH: 0
PALPITATIONS: 0
HEARTBURN: 1
CARDIOVASCULAR NEGATIVE: 1
NECK PAIN: 0
HEMOPTYSIS: 0
HEADACHES: 0
FEVER: 0
RESPIRATORY NEGATIVE: 1
NEUROLOGICAL NEGATIVE: 1
FEELING OF CHOKING: 1
CONSTITUTIONAL NEGATIVE: 1
MYALGIAS: 0
ABDOMINAL PAIN: 1
CHILLS: 0
EYES NEGATIVE: 1
BACK PAIN: 1
NERVOUS/ANXIOUS: 1
RESTLESSNESS: 1
CONSTIPATION: 0
DIZZINESS: 0

## 2017-07-26 ASSESSMENT — PATIENT HEALTH QUESTIONNAIRE - PHQ9: CLINICAL INTERPRETATION OF PHQ2 SCORE: 0

## 2017-07-26 NOTE — PROGRESS NOTES
Subjective:      Jed Cramer is a 77 y.o. male who presents with Establish Care; Anxiety; and Hospital Follow-up            HPI Comments: 1. Cerebrovascular accident (CVA), unspecified mechanism (CMS-HCC)  Recent admit last month for cva likely due to new onset afib  Started on eliquis  Has appt with neurology next week  - trazodone (DESYREL) 50 MG Tab; Take 50 mg by mouth every evening.  - metoprolol (LOPRESSOR) 25 MG Tab; Take 25 mg by mouth 2 times a day.  - folic acid (FOLVITE) 1 MG Tab; Take 1 mg by mouth every day.  - apixaban (ELIQUIS) 5mg Tab; Take 5 mg by mouth 2 Times a Day.  - testosterone cypionate (DEPO-TESTOSTERONE) 200 MG/ML Solution injection; 50 mg by Intramuscular route Once.  - Patient identified as fall risk.  Appropriate orders and counseling given.   - Patient identified as having weight management issue.  Appropriate orders and counseling given.  - COMP METABOLIC PANEL; Future  - FREE THYROXINE; Future  - TRIIDOTHYRONINE; Future  - TSH; Future  - VITAMIN D,25 HYDROXY; Future  - CBC WITHOUT DIFFERENTIAL; Future  - LIPID PROFILE; Future    2. Obesity (BMI 30-39.9)    - trazodone (DESYREL) 50 MG Tab; Take 50 mg by mouth every evening.  - metoprolol (LOPRESSOR) 25 MG Tab; Take 25 mg by mouth 2 times a day.  - folic acid (FOLVITE) 1 MG Tab; Take 1 mg by mouth every day.  - apixaban (ELIQUIS) 5mg Tab; Take 5 mg by mouth 2 Times a Day.  - testosterone cypionate (DEPO-TESTOSTERONE) 200 MG/ML Solution injection; 50 mg by Intramuscular route Once.  - Patient identified as fall risk.  Appropriate orders and counseling given.   - Patient identified as having weight management issue.  Appropriate orders and counseling given.  - COMP METABOLIC PANEL; Future  - FREE THYROXINE; Future  - TRIIDOTHYRONINE; Future  - TSH; Future  - VITAMIN D,25 HYDROXY; Future  - CBC WITHOUT DIFFERENTIAL; Future  - LIPID PROFILE; Future    3. Essential hypertension  Currently treated for HTN, taking meds with no CP or sob,  monitors bp at home periodically. controlled      - trazodone (DESYREL) 50 MG Tab; Take 50 mg by mouth every evening.  - metoprolol (LOPRESSOR) 25 MG Tab; Take 25 mg by mouth 2 times a day.  - folic acid (FOLVITE) 1 MG Tab; Take 1 mg by mouth every day.  - apixaban (ELIQUIS) 5mg Tab; Take 5 mg by mouth 2 Times a Day.  - testosterone cypionate (DEPO-TESTOSTERONE) 200 MG/ML Solution injection; 50 mg by Intramuscular route Once.  - Patient identified as fall risk.  Appropriate orders and counseling given.   - Patient identified as having weight management issue.  Appropriate orders and counseling given.  - COMP METABOLIC PANEL; Future  - FREE THYROXINE; Future  - TRIIDOTHYRONINE; Future  - TSH; Future  - VITAMIN D,25 HYDROXY; Future  - CBC WITHOUT DIFFERENTIAL; Future  - LIPID PROFILE; Future    4. 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  - metoprolol (LOPRESSOR) 25 MG Tab; Take 25 mg by mouth 2 times a day.  - folic acid (FOLVITE) 1 MG Tab; Take 1 mg by mouth every day.  - apixaban (ELIQUIS) 5mg Tab; Take 5 mg by mouth 2 Times a Day.  - testosterone cypionate (DEPO-TESTOSTERONE) 200 MG/ML Solution injection; 50 mg by Intramuscular route Once.  - Patient identified as fall risk.  Appropriate orders and counseling given.   - Patient identified as having weight management issue.  Appropriate orders and counseling given.  - COMP METABOLIC PANEL; Future  - FREE THYROXINE; Future  - TRIIDOTHYRONINE; Future  - TSH; Future  - VITAMIN D,25 HYDROXY; Future  - CBC WITHOUT DIFFERENTIAL; Future  - LIPID PROFILE; Future    5. Epigastric pain  Has gi appt next week  - trazodone (DESYREL) 50 MG Tab; Take 50 mg by mouth every evening.  - metoprolol (LOPRESSOR) 25 MG Tab; Take 25 mg by mouth 2 times a day.  - folic acid (FOLVITE) 1 MG Tab; Take 1 mg by mouth every day.  - apixaban (ELIQUIS) 5mg Tab; Take 5 mg by mouth 2  Times a Day.  - testosterone cypionate (DEPO-TESTOSTERONE) 200 MG/ML Solution injection; 50 mg by Intramuscular route Once.  - Patient identified as fall risk.  Appropriate orders and counseling given.   - Patient identified as having weight management issue.  Appropriate orders and counseling given.  - COMP METABOLIC PANEL; Future  - FREE THYROXINE; Future  - TRIIDOTHYRONINE; Future  - TSH; Future  - VITAMIN D,25 HYDROXY; Future  - CBC WITHOUT DIFFERENTIAL; Future  - LIPID PROFILE; Future    6. Chronic pain syndrome  Per pain clinic  - trazodone (DESYREL) 50 MG Tab; Take 50 mg by mouth every evening.  - metoprolol (LOPRESSOR) 25 MG Tab; Take 25 mg by mouth 2 times a day.  - folic acid (FOLVITE) 1 MG Tab; Take 1 mg by mouth every day.  - apixaban (ELIQUIS) 5mg Tab; Take 5 mg by mouth 2 Times a Day.  - testosterone cypionate (DEPO-TESTOSTERONE) 200 MG/ML Solution injection; 50 mg by Intramuscular route Once.  - Patient identified as fall risk.  Appropriate orders and counseling given.   - Patient identified as having weight management issue.  Appropriate orders and counseling given.  - COMP METABOLIC PANEL; Future  - FREE THYROXINE; Future  - TRIIDOTHYRONINE; Future  - TSH; Future  - VITAMIN D,25 HYDROXY; Future  - CBC WITHOUT DIFFERENTIAL; Future  - LIPID PROFILE; Future    7. Paroxysmal atrial fibrillation (CMS-HCC)  Has appt with cardiology next week started on eliquis last month    8. Anxiety  Long term, had confusion due to valium last year will try non benzo med for this  - buPROPion (WELLBUTRIN) 100 MG Tab; Take 1 Tab by mouth 2 times a day.  Dispense: 60 Tab; Refill: 3    Past Medical History:    S/P appendectomy                                5/8/2012      Anxiety state, unspecified                      5/8/2012      Lumbago                                         5/8/2012      Cellulitis and abscess of leg, except foot      5/8/2012      Other and unspecified noninfectious gastroente* 5/8/2012      Knee  joint replacement by other means           5/8/2012      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           5/8/2012      Personal history of urinary calculi             5/8/2012      Obesity, unspecified                            5/8/2012      Obstructive sleep apnea (adult) (pediatric)     5/8/2012        Comment:unclear if true    Opioid type dependence, unspecified             5/8/2012      Leg DVT (deep venous thromboembolism), acute (* 1980s         Knee joint injury                               1970s           Comment:Injury in training for [employer]    Colitis                                                         Comment:Hospitalized 3/2012    CRPS (complex regional pain syndrome)                           Comment:Of L leg after surgery 2011    Chronic pain syndrome                                           Comment:Implanted pain pump managed by Dr Shane Pena    Depression                                                    Malignant neoplasm of bladder, part unspecified 2010            Comment:Radical cystectomy with prostatectomy, T2N0M0                with orthotopic neobladder, 2010    Osteoporosis                                                  Stroke (CMS-[facility])                                              A-fib (CMS-[facility])                                             Past Surgical History:    CYSTOPROSTATECTOMY RADICAL                                       Comment:Orthotopic neobladder construction with Bell                9/2010    APPENDECTOMY                                                   CARPAL TUNNEL RELEASE                                          TONSILLECTOMY                                                  ANKLE LIGAMENT RECONSTRUCTION                                    Comment:x 4 surgeries    ROTATOR CUFF REPAIR                              9/2003          Comment:R side    BLADDER BIOPSY                                   2010          PUMP INSERT/REMOVE                               2012             Comment:Pain pump    ATHROPLASTY                                                      Comment:23 L  knee surgeries -- 1997 replacement became               infected including MRSA, total knee                reconstruction done 8/2011 with no infection                afterwards but recurrent sx of                pain/swelling/redness -- Dr Chrissy Gray                [phone] - now followed by Dr Ricardo Mead and Dmitriy Treviño in [place]    Smoking Status: Current Some Day Smoker         Packs/Day: 1.00  Years: 10         Last Attempt to quit: 06/26/2016    Smokeless Status: Current User                      Comment: chewing tobacco occ    Alcohol Use: No              Review of patient's family history indicates:    Cancer                         Mother                      Comment: breast    Heart Attack                   Father                    Heart Disease                  Father                      Current outpatient prescriptions: •  trazodone (DESYREL) 50 MG Tab, Take 50 mg by mouth every evening., Disp: , Rfl: •  metoprolol (LOPRESSOR) 25 MG Tab, Take 25 mg by mouth 2 times a day., Disp: , Rfl: •  folic acid (FOLVITE) 1 MG Tab, Take 1 mg by mouth every day., Disp: , Rfl: •  apixaban (ELIQUIS) 5mg Tab, Take 5 mg by mouth 2 Times a Day., Disp: , Rfl: •  testosterone cypionate (DEPO-TESTOSTERONE) 200 MG/ML Solution injection, 50 mg by Intramuscular route Once., Disp: , Rfl: •  buPROPion (WELLBUTRIN) 100 MG Tab, Take 1 Tab by mouth 2 times a day., Disp: 60 Tab, Rfl: 3•  duloxetine (CYMBALTA) 60 MG Cap DR Particles delayed-release capsule, TAKE ONE CAPSULE BY MOUTH DAILY, Disp: 90 Cap, Rfl: 0•  oxycodone immediate-release (ROXICODONE) 5 MG TABS, Take 15 mg by mouth every four hours as needed., Disp: , Rfl: •  ondansetron (ZOFRAN) 4 MG Tab tablet, Take 1 Tab by mouth every four hours as needed for Nausea/Vomiting., Disp: 20 Tab, Rfl: 0•  cefdinir (OMNICEF) 300 MG Cap, Take 1  Cap by mouth 2 times a day., Disp: 20 Cap, Rfl: 0•  denosumab (PROLIA) 60 MG/ML Solution, Inject 60 mg as instructed Once., Disp: , Rfl: •  aspirin (ASA) 325 MG Tab, Take 325 mg by mouth every 6 hours as needed., Disp: , Rfl: •  Cholecalciferol (VITAMIN D3) 5000 UNITS Tab, Take 5,000 Units by mouth every day., Disp: 30 Tab, Rfl: •  benazepril (LOTENSIN) 40 MG tablet, Take 1 Tab by mouth every day., Disp: 90 Tab, Rfl: 2•  ondansetron (ZOFRAN) 4 MG TABS tablet, Take 1 Tab by mouth every four hours as needed for Nausea/Vomiting., Disp: 16 Tab, Rfl: 2•  sodium bicarbonate (SODIUM BICARBONATE) 650 MG TABS, Take 2 Tabs by mouth 3 times a day., Disp: 120 Tab, Rfl: 3        Anxiety  Presents for initial visit. Onset was more than 5 years ago. The problem has been gradually worsening. Symptoms include excessive worry, feeling of choking, nervous/anxious behavior and restlessness. Patient reports no chest pain, dizziness, palpitations or suicidal ideas. Symptoms occur most days. The severity of symptoms is causing significant distress. Nothing aggravates the symptoms. The quality of sleep is poor.     There are no known risk factors.       Review of Systems   Constitutional: Negative.  Negative for fever and chills.        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           5/8/2012      Personal history of urinary calculi             5/8/2012      Obesity, unspecified                            5/8/2012      Obstructive sleep apnea (adult) (pediatric)     5/8/2012        Comment:unclear if true    Opioid type dependence, unspecified              5/8/2012      Leg DVT (deep venous thromboembolism), acute (* 1980s         Knee joint injury                               1970s           Comment:Injury in training for [employer]    Colitis                                                         Comment:Hospitalized 3/2012    CRPS (complex regional pain syndrome)                           Comment:Of L leg after surgery 2011    Chronic pain syndrome                                           Comment:Implanted pain pump managed by Dr Shane Pena    Depression                                                    Malignant neoplasm of bladder, part unspecified 2010            Comment:Radical cystectomy with prostatectomy, T2N0M0                with orthotopic neobladder, 2010    Osteoporosis                                                  Stroke (CMS-HCC)                                              A-fib (CMS-[facility])                                             Past Surgical History:    CYSTOPROSTATECTOMY RADICAL                                       Comment:Orthotopic neobladder construction with Bell                9/2010    APPENDECTOMY                                                   CARPAL TUNNEL RELEASE                                          TONSILLECTOMY                                                  ANKLE LIGAMENT RECONSTRUCTION                                    Comment:x 4 surgeries    ROTATOR CUFF REPAIR                              9/2003          Comment:R side    BLADDER BIOPSY                                   2010          PUMP INSERT/REMOVE                               2012            Comment:Pain pump    ATHROPLASTY                                                      Comment:23 L  knee surgeries -- 1997 replacement became               infected including MRSA, total knee                reconstruction done 8/2011 with no infection                afterwards but recurrent sx of                pain/swelling/redness -- Dr Chrissy Gray            "     [phone] - now followed by Dr Ricardo Mead and Dmitiry Treviño in [place]    Smoking Status: Current Some Day Smoker         Packs/Day: 1.00  Years: 10        Last Attempt to quit: 06/26/2016    Smokeless Status: Current User                      Comment: chewing tobacco occ    Alcohol Use: No              Review of patient's family history indicates:    Cancer                         Mother                      Comment: breast    Heart Attack                   Father                    Heart Disease                  Father                     HENT: Negative.    Eyes: Negative.    Respiratory: Negative.  Negative for cough and hemoptysis.    Cardiovascular: Negative.  Negative for chest pain and palpitations.   Gastrointestinal: Positive for heartburn and abdominal pain. Negative for constipation.   Genitourinary: Negative.  Negative for dysuria and urgency.   Musculoskeletal: Positive for back pain and joint pain. Negative for myalgias and neck pain.   Skin: Negative.  Negative for rash.   Neurological: Negative.  Negative for dizziness and headaches.   Endo/Heme/Allergies: Negative.    Psychiatric/Behavioral: Negative for suicidal ideas. The patient is nervous/anxious.           Objective:     /70 mmHg  Pulse 68  Temp(Src) 35.9 °C (96.6 °F)  Resp 16  Ht 1.778 m (5' 10\")  Wt 106.142 kg (234 lb)  BMI 33.58 kg/m2  SpO2 97%     Physical Exam   Constitutional: He is oriented to person, place, and time. No distress.   HENT:   Head: Normocephalic and atraumatic.   Right Ear: External ear normal.   Left Ear: External ear normal.   Nose: Nose normal.   Mouth/Throat: Oropharynx is clear and moist. No oropharyngeal exudate.   Eyes: Pupils are equal, round, and reactive to light. Right eye exhibits no discharge. Left eye exhibits no discharge. No scleral icterus.   Neck: Normal range of motion. Neck supple. No JVD present. No tracheal deviation present. No thyromegaly present. "   Cardiovascular: Normal rate, regular rhythm, normal heart sounds and intact distal pulses.  Exam reveals no gallop and no friction rub.    No murmur heard.  nsr today   Pulmonary/Chest: Effort normal and breath sounds normal. No stridor. No respiratory distress. He has no wheezes. He has no rales. He exhibits no tenderness.   Abdominal: Soft. He exhibits no distension. There is no tenderness.   Musculoskeletal:   Uses walker for ambulation, multiple surgical scars on legs      Healing hematoma on posterior right leg, no erythema or warmth   Lymphadenopathy:     He has no cervical adenopathy.   Neurological: He is alert and oriented to person, place, and time.   Skin: Skin is warm and dry. He is not diaphoretic.   Psychiatric: Judgment normal.   Nursing note and vitals reviewed.              Assessment/Plan:     1. Cerebrovascular accident (CVA), unspecified mechanism (CMS-HCC)    - trazodone (DESYREL) 50 MG Tab; Take 50 mg by mouth every evening.  - metoprolol (LOPRESSOR) 25 MG Tab; Take 25 mg by mouth 2 times a day.  - folic acid (FOLVITE) 1 MG Tab; Take 1 mg by mouth every day.  - apixaban (ELIQUIS) 5mg Tab; Take 5 mg by mouth 2 Times a Day.  - testosterone cypionate (DEPO-TESTOSTERONE) 200 MG/ML Solution injection; 50 mg by Intramuscular route Once.  - Patient identified as fall risk.  Appropriate orders and counseling given.  - Patient identified as having weight management issue.  Appropriate orders and counseling given.  - COMP METABOLIC PANEL; Future  - FREE THYROXINE; Future  - TRIIDOTHYRONINE; Future  - TSH; Future  - VITAMIN D,25 HYDROXY; Future  - CBC WITHOUT DIFFERENTIAL; Future  - LIPID PROFILE; Future    2. Obesity (BMI 30-39.9)    - trazodone (DESYREL) 50 MG Tab; Take 50 mg by mouth every evening.  - metoprolol (LOPRESSOR) 25 MG Tab; Take 25 mg by mouth 2 times a day.  - folic acid (FOLVITE) 1 MG Tab; Take 1 mg by mouth every day.  - apixaban (ELIQUIS) 5mg Tab; Take 5 mg by mouth 2 Times a Day.  -  testosterone cypionate (DEPO-TESTOSTERONE) 200 MG/ML Solution injection; 50 mg by Intramuscular route Once.  - Patient identified as fall risk.  Appropriate orders and counseling given.  - Patient identified as having weight management issue.  Appropriate orders and counseling given.  - COMP METABOLIC PANEL; Future  - FREE THYROXINE; Future  - TRIIDOTHYRONINE; Future  - TSH; Future  - VITAMIN D,25 HYDROXY; Future  - CBC WITHOUT DIFFERENTIAL; Future  - LIPID PROFILE; Future    3. Essential hypertension    - trazodone (DESYREL) 50 MG Tab; Take 50 mg by mouth every evening.  - metoprolol (LOPRESSOR) 25 MG Tab; Take 25 mg by mouth 2 times a day.  - folic acid (FOLVITE) 1 MG Tab; Take 1 mg by mouth every day.  - apixaban (ELIQUIS) 5mg Tab; Take 5 mg by mouth 2 Times a Day.  - testosterone cypionate (DEPO-TESTOSTERONE) 200 MG/ML Solution injection; 50 mg by Intramuscular route Once.  - Patient identified as fall risk.  Appropriate orders and counseling given.  - Patient identified as having weight management issue.  Appropriate orders and counseling given.  - COMP METABOLIC PANEL; Future  - FREE THYROXINE; Future  - TRIIDOTHYRONINE; Future  - TSH; Future  - VITAMIN D,25 HYDROXY; Future  - CBC WITHOUT DIFFERENTIAL; Future  - LIPID PROFILE; Future    4. 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  - metoprolol (LOPRESSOR) 25 MG Tab; Take 25 mg by mouth 2 times a day.  - folic acid (FOLVITE) 1 MG Tab; Take 1 mg by mouth every day.  - apixaban (ELIQUIS) 5mg Tab; Take 5 mg by mouth 2 Times a Day.  - testosterone cypionate (DEPO-TESTOSTERONE) 200 MG/ML Solution injection; 50 mg by Intramuscular route Once.  - Patient identified as fall risk.  Appropriate orders and counseling given.  - Patient identified as having weight management issue.  Appropriate orders and counseling given.  - COMP METABOLIC PANEL; Future  - FREE  THYROXINE; Future  - TRIIDOTHYRONINE; Future  - TSH; Future  - VITAMIN D,25 HYDROXY; Future  - CBC WITHOUT DIFFERENTIAL; Future  - LIPID PROFILE; Future    5. Epigastric pain    - trazodone (DESYREL) 50 MG Tab; Take 50 mg by mouth every evening.  - metoprolol (LOPRESSOR) 25 MG Tab; Take 25 mg by mouth 2 times a day.  - folic acid (FOLVITE) 1 MG Tab; Take 1 mg by mouth every day.  - apixaban (ELIQUIS) 5mg Tab; Take 5 mg by mouth 2 Times a Day.  - testosterone cypionate (DEPO-TESTOSTERONE) 200 MG/ML Solution injection; 50 mg by Intramuscular route Once.  - Patient identified as fall risk.  Appropriate orders and counseling given.  - Patient identified as having weight management issue.  Appropriate orders and counseling given.  - COMP METABOLIC PANEL; Future  - FREE THYROXINE; Future  - TRIIDOTHYRONINE; Future  - TSH; Future  - VITAMIN D,25 HYDROXY; Future  - CBC WITHOUT DIFFERENTIAL; Future  - LIPID PROFILE; Future    6. Chronic pain syndrome    - trazodone (DESYREL) 50 MG Tab; Take 50 mg by mouth every evening.  - metoprolol (LOPRESSOR) 25 MG Tab; Take 25 mg by mouth 2 times a day.  - folic acid (FOLVITE) 1 MG Tab; Take 1 mg by mouth every day.  - apixaban (ELIQUIS) 5mg Tab; Take 5 mg by mouth 2 Times a Day.  - testosterone cypionate (DEPO-TESTOSTERONE) 200 MG/ML Solution injection; 50 mg by Intramuscular route Once.  - Patient identified as fall risk.  Appropriate orders and counseling given.  - Patient identified as having weight management issue.  Appropriate orders and counseling given.  - COMP METABOLIC PANEL; Future  - FREE THYROXINE; Future  - TRIIDOTHYRONINE; Future  - TSH; Future  - VITAMIN D,25 HYDROXY; Future  - CBC WITHOUT DIFFERENTIAL; Future  - LIPID PROFILE; Future    7. Paroxysmal atrial fibrillation (CMS-HCC)      8. Anxiety  - buPROPion (WELLBUTRIN) 100 MG Tab; Take 1 Tab by mouth 2 times a day.  Dispense: 60 Tab; Refill: 3

## 2017-07-26 NOTE — MR AVS SNAPSHOT
"        Jed HOU Shoaib   2017 11:30 AM   Office Visit   MRN: [id number]    Department:  [facility]    Dept Phone:  [phone]    Description:  Male : 1940   Provider:  Abraham Morrissey M.D.           Reason for Visit     [facility]           Allergies as of 2017     Allergen Noted Reactions    Ciprofloxacin Hcl 2015       Wife states it causes c-diff      You were diagnosed with     Cerebrovascular accident (CVA), unspecified mechanism (CMS-HCC)   [8258506]       Obesity (BMI 30-39.9)   [600041]       Essential hypertension   [6893853]       H/O primary malignant neoplasm of urinary bladder   [514311]       Epigastric pain   [270953]       Chronic pain syndrome   [338.4.ICD-9-CM]       Paroxysmal atrial fibrillation (CMS-[facility])   [693201]       Anxiety   [623197]         Vital Signs     Blood Pressure Pulse Temperature Respirations Height Weight    120/70 mmHg 68 35.9 °C (96.6 °F) 16 1.778 m (5' 10\") 106.142 kg (234 lb)    Body Mass Index Oxygen Saturation Smoking Status             33.58 kg/m2 97% Current Some Day Smoker         Basic Information     Date Of Birth Sex Race Ethnicity Preferred Language    1940 Male White Non- English      Your appointments     Aug 14, 2017  2:20 PM   New Patient with [facility]   [facility] (--)    [address], [address]  [facility] [id number] [phone]           Please bring Photo ID, Insurance Cards, All Medication Bottles and copies of any legal documents (such as Living Will, Power of ) If speaking a language besides English please bring an adult . Please arrive 30 minutes prior for check in and registration. You will be receiving a confirmation call a few days before your appointment from our automated call confirmation system.              Problem List              ICD-10-CM Priority Class Noted - Resolved    Chronic pain syndrome G89.4 High  " 5/8/2012 - Present    HTN (hypertension) I10 High  12/3/2013 - Present    Self-catheterizes urinary bladder Z78.9 Medium  11/25/2014 - Present    H/O primary malignant neoplasm of urinary bladder - s/p farshad bladder reconstruction Z85.51   3/20/2015 - Present    Obesity (BMI 30-39.9) E66.9   7/26/2017 - Present    A-fib (CMS-HCC) I48.91   Unknown - Present      Health Maintenance        Date Due Completion Dates    IMM DTaP/Tdap/Td Vaccine (1 - Tdap) 2/27/1961 ---    IMM ZOSTER VACCINE 2/27/2002 ---    IMM PNEUMOCOCCAL 65+ (ADULT) LOW/MEDIUM RISK SERIES (2 of 2 - PCV13) 4/24/2014 4/24/2013    IMM INFLUENZA (1) 9/1/2017 12/3/2013, 12/11/2012    COLONOSCOPY 9/9/2024 9/9/2014 (Declined)    Override on 9/9/2014: Patient Declined (see notes, last 2000, survival and risk data, patient elected no screen)            Current Immunizations     Influenza TIV (IM) 12/3/2013, 12/11/2012    Pneumococcal Vaccine (UF)Historical Data 11/9/2007    Pneumococcal polysaccharide vaccine (PPSV-23) 4/24/2013    Tetanus Vaccine 11/9/2007      Below and/or attached are the medications your provider expects you to take. Review all of your home medications and newly ordered medications with your provider and/or pharmacist. Follow medication instructions as directed by your provider and/or pharmacist. Please keep your medication list with you and share with your provider. Update the information when medications are discontinued, doses are changed, or new medications (including over-the-counter products) are added; and carry medication information at all times in the event of emergency situations     Allergies:  CIPROFLOXACIN HCL - (reactions not documented)               Medications  Valid as of: July 26, 2017 - 11:54 AM    Generic Name Brand Name Tablet Size Instructions for use    Apixaban (Tab) ELIQUIS 5mg Take 1 Tab by mouth 2 Times a Day.        Aspirin (Tab)  MG Take 325 mg by mouth every 6 hours as needed.        Benazepril HCl  (Tab) LOTENSIN 40 MG Take 1 Tab by mouth every day.        BuPROPion HCl (Tab) WELLBUTRIN 100 MG Take 1 Tab by mouth 2 times a day.        Cefdinir (Cap) OMNICEF 300 MG Take 1 Cap by mouth 2 times a day.        Cholecalciferol (Tab) Vitamin D3 5000 UNITS Take 5,000 Units by mouth every day.        Denosumab (Solution) PROLIA 60 MG/ML Inject 60 mg as instructed Once.        DULoxetine HCl (Cap DR Particles) CYMBALTA 60 MG TAKE ONE CAPSULE BY MOUTH DAILY        Folic Acid (Tab) FOLVITE 1 MG Take 1 mg by mouth every day.        Metoprolol Tartrate (Tab) LOPRESSOR 25 MG Take 1 Tab by mouth 2 times a day.        Ondansetron HCl (Tab) ZOFRAN 4 MG Take 1 Tab by mouth every four hours as needed for Nausea/Vomiting.        Ondansetron HCl (Tab) ZOFRAN 4 MG Take 1 Tab by mouth every four hours as needed for Nausea/Vomiting.        OxyCODONE HCl (Tab) ROXICODONE 5 MG Take 15 mg by mouth every four hours as needed.        Sodium Bicarbonate (Tab) SODIUM BICARBONATE 650 MG Take 2 Tabs by mouth 3 times a day.        Testosterone Cypionate (Solution) DEPO-TESTOSTERONE 200 MG/ML 50 mg by Intramuscular route Once.        TraZODone HCl (Tab) DESYREL 50 MG Take 50 mg by mouth every evening.        .                 Medicines prescribed today were sent to:     [facility][id number] - [place], [place] - [address]    [address] [facility] [address]    Phone: [phone] Fax: [phone]    Open 24 Hours?: No    [facility] - [place], [place] - [facility] [address]    Phone: [phone] Fax: [phone]    Open 24 Hours?: No      Medication refill instructions:       If your prescription bottle indicates you have medication refills left, it is not necessary to call your provider’s office. Please contact your pharmacy and they will refill your medication.    If your prescription bottle indicates you do not have any refills left, you may request refills at any time through one of  the following ways: The online Centaur system (except Urgent Care), by calling your provider’s office, or by asking your pharmacy to contact your provider’s office with a refill request. Medication refills are processed only during regular business hours and may not be available until the next business day. Your provider may request additional information or to have a follow-up visit with you prior to refilling your medication.   *Please Note: Medication refills are assigned a new Rx number when refilled electronically. Your pharmacy may indicate that no refills were authorized even though a new prescription for the same medication is available at the pharmacy. Please request the medicine by name with the pharmacy before contacting your provider for a refill.        Your To Do List     Future Labs/Procedures Complete By Expires    CBC WITHOUT DIFFERENTIAL  As directed 1/26/2018    COMP METABOLIC PANEL  As directed 7/26/2018    FREE THYROXINE  As directed 7/26/2018    LIPID PROFILE  As directed 7/26/2018    TRIIDOTHYRONINE  As directed 7/26/2018    TSH  As directed 7/26/2018    VITAMIN D,25 HYDROXY  As directed 7/26/2018         Centaur Access Code: [email or web address]  Expires: 8/11/2017  4:17 AM    Centaur  A secure, online tool to manage your health information     [employer]’s Centaur® is a secure, online tool that connects you to your personalized health information from the privacy of your home -- day or night - making it very easy for you to manage your healthcare. Once the activation process is completed, you can even access your medical information using the Centaur ally, which is available for free in the Apple Ally store or Google Play store.     Centaur provides the following levels of access (as shown below):   My Chart Features   [facility] Doctor [facility]  Specialists [facility]  Urgent  Care Non-[facility]  Primary Care  Doctor   Email your healthcare team securely and privately 24/7 X X X    Manage  appointments: schedule your next appointment; view details of past/upcoming appointments X      Request prescription refills. X      View recent personal medical records, including lab and immunizations X X X X   View health record, including health history, allergies, medications X X X X   Read reports about your outpatient visits, procedures, consult and ER notes X X X X   See your discharge summary, which is a recap of your hospital and/or ER visit that includes your diagnosis, lab results, and care plan. X X       How to register for Tangled:  1. Go to  [email or web address].  2. Click on the Sign Up Now box, which takes you to the New Member Sign Up page. You will need to provide the following information:  a. Enter your Tangled Access Code exactly as it appears at the top of this page. (You will not need to use this code after you’ve completed the sign-up process. If you do not sign up before the expiration date, you must request a new code.)   b. Enter your date of birth.   c. Enter your home email address.   d. Click Submit, and follow the next screen’s instructions.  3. Create a Tangled ID. This will be your Tangled login ID and cannot be changed, so think of one that is secure and easy to remember.  4. Create a Tangled password. You can change your password at any time.  5. Enter your Password Reset Question and Answer. This can be used at a later time if you forget your password.   6. Enter your e-mail address. This allows you to receive e-mail notifications when new information is available in Tangled.  7. Click Sign Up. You can now view your health information.    For assistance activating your Tangled account, call [phone]        Quit Tobacco Information     Do you want to quit using tobacco?    Quitting tobacco decreases risks of cancer, heart and lung disease, increases life expectancy, improves sense of taste and smell, and increases spending money, among other benefits.    If you are  thinking about quitting, we can help.  • [facility] Quit Tobacco Program: 927-342-3390  o Program occurs weekly for four weeks and includes pharmacist consultation on products to support quitting smoking or chewing tobacco. A provider referral is needed for pharmacist consultation.  • Tobacco Users Help Hotline: 7-800-QUIT-NOW (125-8750) or https://[place].quitlogix.org/  o Free, confidential telephone and online coaching for [place] residents. Sessions are designed on a schedule that is convenient for you. Eligible clients receive free nicotine replacement therapy.  • Nationally: www.smokefree.gov  o Information and professional assistance to support both immediate and long-term needs as you become, and remain, a non-smoker. Smokefree.gov allows you to choose the help that best fits your needs.

## 2017-07-26 NOTE — Clinical Note
"July 26, 2017        Jed Cramer,     Welcome to N[employer].     You can send messages, schedule appointments, and request medication refills by sending your healthcare provider a \"Non-Urgent Medical Question.\" To login you may go to Intrallect  or you on your Smartphone by downloading the Nanalysis ally (available in the Apple Ally store ([employer]) or Google Play store).     Username: Canelo     Password: !Password#    If you have any questions/concerns please do not hesitate to contact me at your earliest convenience @ [phone].     Nicole Mayfield, Med Ass't                          Nicole Mayfield    "

## 2017-08-09 ENCOUNTER — OFFICE VISIT (OUTPATIENT)
Dept: MEDICAL GROUP | Facility: PHYSICIAN GROUP | Age: 77
End: 2017-08-09
Payer: MEDICARE

## 2017-08-09 VITALS
HEART RATE: 71 BPM | RESPIRATION RATE: 16 BRPM | BODY MASS INDEX: 31.92 KG/M2 | OXYGEN SATURATION: 93 % | TEMPERATURE: 96.4 F | WEIGHT: 223 LBS | HEIGHT: 70 IN | DIASTOLIC BLOOD PRESSURE: 70 MMHG | SYSTOLIC BLOOD PRESSURE: 112 MMHG

## 2017-08-09 DIAGNOSIS — F41.9 ANXIETY: ICD-10-CM

## 2017-08-09 DIAGNOSIS — I48.0 PAROXYSMAL ATRIAL FIBRILLATION (HCC): ICD-10-CM

## 2017-08-09 DIAGNOSIS — Z86.73 HISTORY OF CVA (CEREBROVASCULAR ACCIDENT): ICD-10-CM

## 2017-08-09 DIAGNOSIS — I10 ESSENTIAL HYPERTENSION: ICD-10-CM

## 2017-08-09 PROCEDURE — 99214 OFFICE O/P EST MOD 30 MIN: CPT | Performed by: FAMILY MEDICINE

## 2017-08-09 RX ORDER — BUPROPION HYDROCHLORIDE 100 MG/1
100 TABLET ORAL 3 TIMES DAILY
Qty: 90 TAB | Refills: 1 | Status: SHIPPED | OUTPATIENT
Start: 2017-08-09 | End: 2017-08-09 | Stop reason: SDUPTHER

## 2017-08-09 RX ORDER — BUPROPION HYDROCHLORIDE 100 MG/1
100 TABLET ORAL 3 TIMES DAILY
Qty: 90 TAB | Refills: 1 | Status: ON HOLD | OUTPATIENT
Start: 2017-08-09 | End: 2017-11-08

## 2017-08-09 RX ORDER — QUETIAPINE FUMARATE 50 MG/1
50 TABLET, FILM COATED ORAL 2 TIMES DAILY
Qty: 60 TAB | Refills: 3 | Status: SHIPPED | OUTPATIENT
Start: 2017-08-09

## 2017-08-09 ASSESSMENT — ENCOUNTER SYMPTOMS
HEADACHES: 0
CHILLS: 0
PALPITATIONS: 0
NECK PAIN: 0
ROS GI COMMENTS: 1
NERVOUS/ANXIOUS: 1
CARDIOVASCULAR NEGATIVE: 1
THOUGHT CONTENT - OBSESSIONS: 1
COUGH: 0
FEVER: 0
NEUROLOGICAL NEGATIVE: 1
RESPIRATORY NEGATIVE: 1
CONSTIPATION: 0
MYALGIAS: 0
CONSTITUTIONAL NEGATIVE: 1
MUSCULOSKELETAL NEGATIVE: 1
EYES NEGATIVE: 1
GASTROINTESTINAL NEGATIVE: 1
DIZZINESS: 0
HEMOPTYSIS: 0

## 2017-08-09 NOTE — PROGRESS NOTES
Subjective:      Jed Cramer is a 77 y.o. male who presents with GI Problem; Enuresis; and Anxiety            HPI Comments: 1. Essential hypertension  Currently treated for HTN, taking meds with no CP or sob, monitors bp at home periodically. controlled        2. Paroxysmal atrial fibrillation (CMS-[facility])      3. 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           5/8/2012      Personal history of urinary calculi             5/8/2012      Obesity, unspecified                            5/8/2012      Obstructive sleep apnea (adult) (pediatric)     5/8/2012        Comment:unclear if true    Opioid type dependence, unspecified             5/8/2012      Leg DVT (deep venous thromboembolism), acute (* 1980s         Knee joint injury                               1970s           Comment:Injury in training for [employer]    Colitis                                                         Comment:Hospitalized 3/2012    CRPS (complex regional pain syndrome)                           Comment:Of L leg after surgery 2011    Chronic pain syndrome                                           Comment:Implanted pain pump managed by Dr Shane Pena    Depression                                                    Malignant neoplasm of bladder, part unspecified 2010            Comment:Radical cystectomy with prostatectomy, T2N0M0                with orthotopic neobladder, 2010    Osteoporosis                                                  Stroke (CMS-HCC)                                               A-fib (CMS-HCC)                                             Past Surgical History:    CYSTOPROSTATECTOMY RADICAL                                       Comment:Orthotopic neobladder construction with Bell                9/2010    APPENDECTOMY                                                   CARPAL TUNNEL RELEASE                                          TONSILLECTOMY                                                  ANKLE LIGAMENT RECONSTRUCTION                                    Comment:x 4 surgeries    ROTATOR CUFF REPAIR                              9/2003          Comment:R side    BLADDER BIOPSY                                   2010          PUMP INSERT/REMOVE                               2012            Comment:Pain pump    ATHROPLASTY                                                      Comment:23 L  knee surgeries -- 1997 replacement became               infected including MRSA, total knee                reconstruction done 8/2011 with no infection                afterwards but recurrent sx of                pain/swelling/redness -- Dr Chrissy Guo [place]                [phone] - now followed by Dr Ricardo Mead and Dmitriy Treviño in [place]    Smoking Status: Current Some Day Smoker         Packs/Day: 1.00  Years: 10         Last Attempt to quit: 06/26/2016    Smokeless Status: Current User                      Comment: chewing tobacco occ    Alcohol Use: No              Review of patient's family history indicates:    Cancer                         Mother                      Comment: breast    Heart Attack                   Father                    Heart Disease                  Father                      Current outpatient prescriptions: •  trazodone (DESYREL) 50 MG Tab, Take 50 mg by mouth every evening., Disp: , Rfl: •  folic acid (FOLVITE) 1 MG Tab, Take 1 mg by mouth every day., Disp: , Rfl: •  testosterone cypionate (DEPO-TESTOSTERONE) 200  MG/ML Solution injection, 50 mg by Intramuscular route Once., Disp: , Rfl: •  buPROPion (WELLBUTRIN) 100 MG Tab, Take 1 Tab by mouth 2 times a day., Disp: 60 Tab, Rfl: 3•  metoprolol (LOPRESSOR) 25 MG Tab, Take 1 Tab by mouth 2 times a day., Disp: 60 Tab, Rfl: 3•  apixaban (ELIQUIS) 5mg Tab, Take 1 Tab by mouth 2 Times a Day., Disp: 60 Tab, Rfl: 3•  ondansetron (ZOFRAN) 4 MG Tab tablet, Take 1 Tab by mouth every four hours as needed for Nausea/Vomiting., Disp: 20 Tab, Rfl: 0•  cefdinir (OMNICEF) 300 MG Cap, Take 1 Cap by mouth 2 times a day., Disp: 20 Cap, Rfl: 0•  denosumab (PROLIA) 60 MG/ML Solution, Inject 60 mg as instructed Once., Disp: , Rfl: •  Cholecalciferol (VITAMIN D3) 5000 UNITS Tab, Take 5,000 Units by mouth every day., Disp: 30 Tab, Rfl: •  duloxetine (CYMBALTA) 60 MG Cap DR Particles delayed-release capsule, TAKE ONE CAPSULE BY MOUTH DAILY, Disp: 90 Cap, Rfl: 0•  oxycodone immediate-release (ROXICODONE) 5 MG TABS, Take 15 mg by mouth every four hours as needed., Disp: , Rfl: •  aspirin (ASA) 325 MG Tab, Take 325 mg by mouth every 6 hours as needed., Disp: , Rfl: •  benazepril (LOTENSIN) 40 MG tablet, Take 1 Tab by mouth every day., Disp: 90 Tab, Rfl: 2•  ondansetron (ZOFRAN) 4 MG TABS tablet, Take 1 Tab by mouth every four hours as needed for Nausea/Vomiting., Disp: 16 Tab, Rfl: 2•  sodium bicarbonate (SODIUM BICARBONATE) 650 MG TABS, Take 2 Tabs by mouth 3 times a day., Disp: 120 Tab, Rfl: 3        GI Problem  Pertinent negatives include no chest pain, chills, coughing, fever, headaches, myalgias, neck pain or rash.   Enuresis  Pertinent negatives include no chest pain, chills, coughing, fever, headaches, myalgias, neck pain or rash.   Anxiety  Presents for initial visit. Onset was more than 5 years ago. The problem has been waxing and waning. Symptoms include excessive worry, nervous/anxious behavior and obsessions. Patient reports no chest pain, dizziness, palpitations or suicidal ideas. Symptoms  occur most days. The severity of symptoms is causing significant distress. The symptoms are aggravated by family issues (thinks about wife's former co-worker who was inappropriate with her). The quality of sleep is fair.     Risk factors include marital problems. There is no history of anemia.       Review of Systems   Constitutional: Negative.  Negative for fever and chills.        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           5/8/2012      Personal history of urinary calculi             5/8/2012      Obesity, unspecified                            5/8/2012      Obstructive sleep apnea (adult) (pediatric)     5/8/2012        Comment:unclear if true    Opioid type dependence, unspecified             5/8/2012      Leg DVT (deep venous thromboembolism), acute (* 1980s         Knee joint injury                               1970s           Comment:Injury in training for [employer]    Colitis                                                         Comment:Hospitalized 3/2012    CRPS (complex regional pain syndrome)                           Comment:Of L leg after surgery 2011    Chronic pain syndrome                                           Comment:Implanted pain pump managed by Dr Shane Pena    Depression                                                    Malignant neoplasm of bladder, part unspecified 2010            Comment:Radical cystectomy with prostatectomy, T2N0M0                with orthotopic neobladder, 2010    Osteoporosis                                                  Stroke (CMS-HCC)                                               A-fib (CMS-HCC)                                             Past Surgical History:    CYSTOPROSTATECTOMY RADICAL                                       Comment:Orthotopic neobladder construction with Bell                9/2010    APPENDECTOMY                                                   CARPAL TUNNEL RELEASE                                          TONSILLECTOMY                                                  ANKLE LIGAMENT RECONSTRUCTION                                    Comment:x 4 surgeries    ROTATOR CUFF REPAIR                              9/2003          Comment:R side    BLADDER BIOPSY                                   2010          PUMP INSERT/REMOVE                               2012            Comment:Pain pump    ATHROPLASTY                                                      Comment:23 L  knee surgeries -- 1997 replacement became               infected including MRSA, total knee                reconstruction done 8/2011 with no infection                afterwards but recurrent sx of                pain/swelling/redness -- Dr Chrissy Guo [place]                [phone] - now followed by Dr Ricardo Mead and Dmitriy Treviño in [place]    Smoking Status: Current Some Day Smoker         Packs/Day: 1.00  Years: 10        Last Attempt to quit: 06/26/2016    Smokeless Status: Current User                      Comment: chewing tobacco occ    Alcohol Use: No              Review of patient's family history indicates:    Cancer                         Mother                      Comment: breast    Heart Attack                   Father                    Heart Disease                  Father                     HENT: Negative.    Eyes: Negative.    Respiratory: Negative.  Negative for cough and hemoptysis.    Cardiovascular: Negative.  Negative for chest pain and palpitations.   Gastrointestinal: Negative.  Negative for constipation.        1   Genitourinary: Positive for  "enuresis. Negative for dysuria and urgency.   Musculoskeletal: Negative.  Negative for myalgias and neck pain.   Skin: Negative.  Negative for rash.   Neurological: Negative.  Negative for dizziness and headaches.   Endo/Heme/Allergies: Negative.    Psychiatric/Behavioral: Negative for suicidal ideas. The patient is nervous/anxious.           Objective:     /70 mmHg  Pulse 71  Temp(Src) 35.8 °C (96.4 °F)  Resp 16  Ht 1.778 m (5' 10\")  Wt 101.152 kg (223 lb)  BMI 32.00 kg/m2  SpO2 93%     Physical Exam   Constitutional: He is oriented to person, place, and time. No distress.   HENT:   Head: Normocephalic and atraumatic.   Right Ear: External ear normal.   Left Ear: External ear normal.   Nose: Nose normal.   Mouth/Throat: Oropharynx is clear and moist. No oropharyngeal exudate.   Eyes: Pupils are equal, round, and reactive to light. Right eye exhibits no discharge. Left eye exhibits no discharge. No scleral icterus.   Neck: Normal range of motion. Neck supple. No JVD present. No tracheal deviation present. No thyromegaly present.   Cardiovascular: Normal rate, regular rhythm, normal heart sounds and intact distal pulses.  Exam reveals no gallop and no friction rub.    No murmur heard.  Pulmonary/Chest: Effort normal and breath sounds normal. No stridor. No respiratory distress. He has no wheezes. He has no rales. He exhibits no tenderness.   Abdominal: Soft. He exhibits no distension. There is no tenderness. A hernia is present.   Soft abdomen, umbilical hernia present   Lymphadenopathy:     He has no cervical adenopathy.   Neurological: He is alert and oriented to person, place, and time.   Skin: Skin is warm and dry. He is not diaphoretic.   Psychiatric: Judgment normal. His mood appears anxious.   Nursing note and vitals reviewed.              Assessment/Plan:     1. Essential hypertension      2. Paroxysmal atrial fibrillation (CMS-HCC)      3. Anxiety  Likely due to multitude of current medical " issues including bladder cancer and stones and history of afib and cva  Will increase wellbutrin and add seroquel

## 2017-08-09 NOTE — MR AVS SNAPSHOT
"        Jed Cramer   2017 8:15 AM   Office Visit   MRN: [id number]    [facility]   Dept Phone:  [phone]    Description:  Male : 1940   Provider:  Abraham Morrissey M.D.           Reason for Visit     GI Problem pains    Enuresis     Anxiety           Allergies as of 2017     Allergen Noted Reactions    Ciprofloxacin Hcl 2015       Wife states it causes c-diff      You were diagnosed with     Essential hypertension   [7973150]       Paroxysmal atrial fibrillation (CMS-HCC)   [679402]       Anxiety   [005700]       History of CVA (cerebrovascular accident)   [694490]         Vital Signs     Blood Pressure Pulse Temperature Respirations Height Weight    112/70 mmHg 71 35.8 °C (96.4 °F) 16 1.778 m (5' 10\") 101.152 kg (223 lb)    Body Mass Index Oxygen Saturation Smoking Status             32.00 kg/m2 93% Current Some Day Smoker         Basic Information     Date Of Birth Sex Race Ethnicity Preferred Language    1940 Male White Non- English      Your appointments     Aug 14, 2017  2:20 PM   New Patient with [facility]   [facility] (--)    [address], [address]  [facility] [id number] [phone]           Please bring Photo ID, Insurance Cards, All Medication Bottles and copies of any legal documents (such as Living Will, Power of ) If speaking a language besides English please bring an adult . Please arrive 30 minutes prior for check in and registration. You will be receiving a confirmation call a few days before your appointment from our automated call confirmation system.            Sep 26, 2017  1:00 PM   Established Patient with Abraham Morrissey M.D.   [facility] (--)    [address] [facility] [id number] [phone]           You will be receiving a confirmation call a few days before your appointment from our automated call confirmation system.              "   Problem List              ICD-10-CM Priority Class Noted - Resolved    Chronic pain syndrome G89.4 High  5/8/2012 - Present    HTN (hypertension) I10 High  12/3/2013 - Present    Self-catheterizes urinary bladder Z78.9 Medium  11/25/2014 - Present    H/O primary malignant neoplasm of urinary bladder - s/p farshad bladder reconstruction Z85.51   3/20/2015 - Present    Obesity (BMI 30-39.9) E66.9   7/26/2017 - Present    A-fib (CMS-HCC) I48.91   Unknown - Present      Health Maintenance        Date Due Completion Dates    IMM DTaP/Tdap/Td Vaccine (1 - Tdap) 2/27/1961 ---    IMM ZOSTER VACCINE 2/27/2002 ---    IMM PNEUMOCOCCAL 65+ (ADULT) LOW/MEDIUM RISK SERIES (2 of 2 - PCV13) 4/24/2014 4/24/2013    IMM INFLUENZA (1) 9/1/2017 12/3/2013, 12/11/2012    COLONOSCOPY 9/9/2024 9/9/2014 (Declined)    Override on 9/9/2014: Patient Declined (see notes, last 2000, survival and risk data, patient elected no screen)            Current Immunizations     Influenza TIV (IM) 12/3/2013, 12/11/2012    Pneumococcal Vaccine (UF)Historical Data 11/9/2007    Pneumococcal polysaccharide vaccine (PPSV-23) 4/24/2013    Tetanus Vaccine 11/9/2007      Below and/or attached are the medications your provider expects you to take. Review all of your home medications and newly ordered medications with your provider and/or pharmacist. Follow medication instructions as directed by your provider and/or pharmacist. Please keep your medication list with you and share with your provider. Update the information when medications are discontinued, doses are changed, or new medications (including over-the-counter products) are added; and carry medication information at all times in the event of emergency situations     Allergies:  CIPROFLOXACIN HCL - (reactions not documented)               Medications  Valid as of: August 09, 2017 -  9:06 AM    Generic Name Brand Name Tablet Size Instructions for use    Apixaban (Tab) ELIQUIS 5mg Take 1 Tab by mouth 2 Times a  Day.        Aspirin (Tab)  MG Take 325 mg by mouth every 6 hours as needed.        Benazepril HCl (Tab) LOTENSIN 40 MG Take 1 Tab by mouth every day.        BuPROPion HCl (Tab) WELLBUTRIN 100 MG Take 1 Tab by mouth 3 times a day.        Cefdinir (Cap) OMNICEF 300 MG Take 1 Cap by mouth 2 times a day.        Cholecalciferol (Tab) Vitamin D3 5000 UNITS Take 5,000 Units by mouth every day.        Denosumab (Solution) PROLIA 60 MG/ML Inject 60 mg as instructed Once.        DULoxetine HCl (Cap DR Particles) CYMBALTA 60 MG TAKE ONE CAPSULE BY MOUTH DAILY        Folic Acid (Tab) FOLVITE 1 MG Take 1 mg by mouth every day.        Metoprolol Tartrate (Tab) LOPRESSOR 25 MG Take 1 Tab by mouth 2 times a day.        Ondansetron HCl (Tab) ZOFRAN 4 MG Take 1 Tab by mouth every four hours as needed for Nausea/Vomiting.        Ondansetron HCl (Tab) ZOFRAN 4 MG Take 1 Tab by mouth every four hours as needed for Nausea/Vomiting.        OxyCODONE HCl (Tab) ROXICODONE 5 MG Take 15 mg by mouth every four hours as needed.        QUEtiapine Fumarate (Tab) SEROQUEL 50 MG Take 1 Tab by mouth 2 times a day.        Sodium Bicarbonate (Tab) SODIUM BICARBONATE 650 MG Take 2 Tabs by mouth 3 times a day.        Testosterone Cypionate (Solution) DEPO-TESTOSTERONE 200 MG/ML 50 mg by Intramuscular route Once.        TraZODone HCl (Tab) DESYREL 50 MG Take 50 mg by mouth every evening.        .                 Medicines prescribed today were sent to:     [id number] - [place], [place] - [address]    [address] [facility] [address]    Phone: [phone] Fax: [phone]    Open 24 Hours?: No    [facility] - [place], [place] - [address] [place] [address]    Phone: [phone] Fax: [phone]    Open 24 Hours?: No      Medication refill instructions:       If your prescription bottle indicates you have medication refills left, it is not necessary to call your provider’s office.  Please contact your pharmacy and they will refill your medication.    If your prescription bottle indicates you do not have any refills left, you may request refills at any time through one of the following ways: The online [employer] system (except Urgent Care), by calling your provider’s office, or by asking your pharmacy to contact your provider’s office with a refill request. Medication refills are processed only during regular business hours and may not be available until the next business day. Your provider may request additional information or to have a follow-up visit with you prior to refilling your medication.   *Please Note: Medication refills are assigned a new Rx number when refilled electronically. Your pharmacy may indicate that no refills were authorized even though a new prescription for the same medication is available at the pharmacy. Please request the medicine by name with the pharmacy before contacting your provider for a refill.           [employer] Access Code: Activation code not generated  Current [employer] Status: Active          Quit Tobacco Information     Do you want to quit using tobacco?    Quitting tobacco decreases risks of cancer, heart and lung disease, increases life expectancy, improves sense of taste and smell, and increases spending money, among other benefits.    If you are thinking about quitting, we can help.  • [facility] Quit Tobacco Program: [phone]  o Program occurs weekly for four weeks and includes pharmacist consultation on products to support quitting smoking or chewing tobacco. A provider referral is needed for pharmacist consultation.  • Tobacco Users Help Hotline: 9-276-QUIT-NOW (297-1963) or https://[place].quitlogix.org/  o Free, confidential telephone and online coaching for [place] residents. Sessions are designed on a schedule that is convenient for you. Eligible clients receive free nicotine replacement therapy.  • Nationally: www.smokefree.gov  o Information and  professional assistance to support both immediate and long-term needs as you become, and remain, a non-smoker. Smokefree.gov allows you to choose the help that best fits your needs.

## 2017-08-14 ENCOUNTER — OFFICE VISIT (OUTPATIENT)
Dept: NEUROLOGY | Facility: MEDICAL CENTER | Age: 77
End: 2017-08-14
Payer: MEDICARE

## 2017-08-14 VITALS
RESPIRATION RATE: 18 BRPM | TEMPERATURE: 97.6 F | DIASTOLIC BLOOD PRESSURE: 80 MMHG | HEIGHT: 70 IN | BODY MASS INDEX: 31.92 KG/M2 | SYSTOLIC BLOOD PRESSURE: 136 MMHG | WEIGHT: 223 LBS | OXYGEN SATURATION: 97 % | HEART RATE: 67 BPM

## 2017-08-14 DIAGNOSIS — G89.4 CHRONIC PAIN SYNDROME: ICD-10-CM

## 2017-08-14 DIAGNOSIS — R10.13 EPIGASTRIC PAIN: ICD-10-CM

## 2017-08-14 DIAGNOSIS — E66.9 OBESITY (BMI 30-39.9): ICD-10-CM

## 2017-08-14 DIAGNOSIS — I63.50 CEREBROVASCULAR ACCIDENT (CVA) DUE TO OCCLUSION OF CEREBRAL ARTERY (HCC): ICD-10-CM

## 2017-08-14 DIAGNOSIS — I10 ESSENTIAL HYPERTENSION: ICD-10-CM

## 2017-08-14 DIAGNOSIS — I63.9 CEREBROVASCULAR ACCIDENT (CVA), UNSPECIFIED MECHANISM (HCC): ICD-10-CM

## 2017-08-14 DIAGNOSIS — Z85.51 H/O PRIMARY MALIGNANT NEOPLASM OF URINARY BLADDER: ICD-10-CM

## 2017-08-14 DIAGNOSIS — I65.23 CAROTID OCCLUSION, BILATERAL: ICD-10-CM

## 2017-08-14 PROCEDURE — 99204 OFFICE O/P NEW MOD 45 MIN: CPT | Performed by: PHYSICIAN ASSISTANT

## 2017-08-14 RX ORDER — CHOLECALCIFEROL (VITAMIN D3) 125 MCG
CAPSULE ORAL
Status: ON HOLD | COMMUNITY
End: 2017-11-08

## 2017-08-14 NOTE — MR AVS SNAPSHOT
"        Jed Cramer   2017 2:20 PM   Office Visit   MRN: [id number]    Department:  [facility]   Dept Phone:  [phone]    Description:  Male : 1940   Provider:  Nathalia Ramos PA-C           Reason for Visit     New Patient Stroke      Allergies as of 2017     Allergen Noted Reactions    Ciprofloxacin Hcl 2015       Wife states it causes c-diff      You were diagnosed with     Cerebrovascular accident (CVA), unspecified mechanism (CMS-[facility])   [8551109]       Obesity (BMI 30-39.9)   [209425]       Essential hypertension   [2939088]       H/O primary malignant neoplasm of urinary bladder   [075879]       Epigastric pain   [763108]       Chronic pain syndrome   [338.4.ICD-9-CM]         Vital Signs     Blood Pressure Pulse Temperature Respirations Height Weight    136/80 mmHg 67 36.4 °C (97.6 °F) 18 1.778 m (5' 10\") 101.152 kg (223 lb)    Body Mass Index Oxygen Saturation Smoking Status             32.00 kg/m2 97% Current Some Day Smoker         Basic Information     Date Of Birth Sex Race Ethnicity Preferred Language    1940 Male White Non- English      Your appointments     Sep 26, 2017  1:00 PM   Established Patient with Abraham Morrissey M.D.   [facility]    [address] [facility] [id number] [phone]           You will be receiving a confirmation call a few days before your appointment from our automated call confirmation system.              Problem List              ICD-10-CM Priority Class Noted - Resolved    Chronic pain syndrome G89.4 High  2012 - Present    HTN (hypertension) I10 High  12/3/2013 - Present    Self-catheterizes urinary bladder Z78.9 Medium  2014 - Present    H/O primary malignant neoplasm of urinary bladder - s/p farshad bladder reconstruction Z85.51   3/20/2015 - Present    Obesity (BMI 30-39.9) E66.9   2017 - Present    A-fib (CMS-HCC) I48.91   Unknown - Present      Health " Maintenance        Date Due Completion Dates    IMM DTaP/Tdap/Td Vaccine (1 - Tdap) 2/27/1961 ---    IMM ZOSTER VACCINE 2/27/2002 ---    IMM PNEUMOCOCCAL 65+ (ADULT) LOW/MEDIUM RISK SERIES (2 of 2 - PCV13) 4/24/2014 4/24/2013    IMM INFLUENZA (1) 9/1/2017 12/3/2013, 12/11/2012    COLONOSCOPY 9/9/2024 9/9/2014 (Declined)    Override on 9/9/2014: Patient Declined (see notes, last 2000, survival and risk data, patient elected no screen)            Current Immunizations     Influenza TIV (IM) 12/3/2013, 12/11/2012    Pneumococcal Vaccine (UF)Historical Data 11/9/2007    Pneumococcal polysaccharide vaccine (PPSV-23) 4/24/2013    Tetanus Vaccine 11/9/2007      Below and/or attached are the medications your provider expects you to take. Review all of your home medications and newly ordered medications with your provider and/or pharmacist. Follow medication instructions as directed by your provider and/or pharmacist. Please keep your medication list with you and share with your provider. Update the information when medications are discontinued, doses are changed, or new medications (including over-the-counter products) are added; and carry medication information at all times in the event of emergency situations     Allergies:  CIPROFLOXACIN HCL - (reactions not documented)               Medications  Valid as of: August 14, 2017 -  3:23 PM    Generic Name Brand Name Tablet Size Instructions for use    Apixaban (Tab) ELIQUIS 5mg Take 1 Tab by mouth 2 Times a Day.        Benazepril HCl (Tab) LOTENSIN 40 MG Take 1 Tab by mouth every day.        BuPROPion HCl (Tab) WELLBUTRIN 100 MG Take 1 Tab by mouth 3 times a day.        Cefdinir (Cap) OMNICEF 300 MG Take 1 Cap by mouth 2 times a day.        Cholecalciferol (Tab) Vitamin D3 5000 UNITS Take 5,000 Units by mouth every day.        Denosumab (Solution) PROLIA 60 MG/ML Inject 60 mg as instructed Once.        DULoxetine HCl (Cap DR Particles) CYMBALTA 60 MG TAKE ONE CAPSULE BY  MOUTH DAILY        Folic Acid (Tab) FOLVITE 1 MG Take 1 mg by mouth every day.        Melatonin (Tab) Melatonin 5 MG Take  by mouth.        Metoprolol Tartrate (Tab) LOPRESSOR 25 MG Take 1 Tab by mouth 2 times a day.        Ondansetron HCl (Tab) ZOFRAN 4 MG Take 1 Tab by mouth every four hours as needed for Nausea/Vomiting.        Ondansetron HCl (Tab) ZOFRAN 4 MG Take 1 Tab by mouth every four hours as needed for Nausea/Vomiting.        OxyCODONE HCl (Tab) ROXICODONE 5 MG Take 15 mg by mouth every four hours as needed.        QUEtiapine Fumarate (Tab) SEROQUEL 50 MG Take 1 Tab by mouth 2 times a day.        Sodium Bicarbonate (Tab) SODIUM BICARBONATE 650 MG Take 2 Tabs by mouth 3 times a day.        Testosterone Cypionate (Solution) DEPO-TESTOSTERONE 200 MG/ML 50 mg by Intramuscular route Once.        TraZODone HCl (Tab) DESYREL 50 MG Take 50 mg by mouth every evening.        .                 Medicines prescribed today were sent to:     [facility] #[id number] - [place], [place] - [address]    [address] [facility] [address]    Phone: [phone] Fax: [phone]    Open 24 Hours?: No    [facility] - [address] [address]    Phone: [phone] Fax: [phone]    Open 24 Hours?: No      Medication refill instructions:       If your prescription bottle indicates you have medication refills left, it is not necessary to call your provider’s office. Please contact your pharmacy and they will refill your medication.    If your prescription bottle indicates you do not have any refills left, you may request refills at any time through one of the following ways: The online [employer] system (except Urgent Care), by calling your provider’s office, or by asking your pharmacy to contact your provider’s office with a refill request. Medication refills are processed only during regular business hours and may not be available until the next business  "day. Your provider may request additional information or to have a follow-up visit with you prior to refilling your medication.   *Please Note: Medication refills are assigned a new Rx number when refilled electronically. Your pharmacy may indicate that no refills were authorized even though a new prescription for the same medication is available at the pharmacy. Please request the medicine by name with the pharmacy before contacting your provider for a refill.        Instructions    DX:  Stroke clinically and also hx of strokes     Personal Risk factors associated with recurrent stroke:    Some risk factors for stroke are \"non-modifiable\" meaning we cannot change them.  Examples of these types of risk factors are:    *   Age - once we turn 55, stroke becomes more common.  It is associated with aging and in fact every decade over 55 our stroke risk doubles.  * Gender - Men have more strokes than women, although this gender difference is only slight  * Ethnicity and/or family history:  if your parents, grandparents, siblings or children have had stroke or if you have ancestors of , , or  descent, you may have inherited some genes pre-disposing your toward stroke    To reduce your risk of recurrent stroke, we want to focus on risk factors we can modify.  Any of the checked items below are your personal risk factors and you should work with your PCP (primary care provider) to get them to the goal (goals are in bold).   These risk factors are:     _NO__Diabetes - denies    _X__Hypertension - goal is <140 .   Current: 136/80  Recommend taking bp once daily and taking log to PCP.    Continue current Blood pressure medication - avoid salt    _X__Hyperlipidemia - goal LDL is <70.  Current: 67    Continue taking cholesterol medication as prescribed.  Modify diet to reduce cholesterol - less cheese, avoid egg yolks, reduce intake of pork, shrimp, and beef.    _X__Smoking: Once you are 5 " years out from when you quit smoking, the history of smoking does not appear to be a significant risk factor for recurrent stroke.  If you are quitting, implement stress reduction with exercise such as daily walking, yoga, or meditation.  If you are unable to quit on your own, ask your PCP about chantix or referral for smoking cessation    _X__Overweight:  Current BMI 32.  Continue to work toward weight loss    Body Mass Index (BMI) is your height compared to your weight.  Goal BMI to reduce risk of recurrent stroke is <25   BMI 25-27 diet/exercise to get to goal BMI.    BMI >27 patient should begin with goal of 10% weight loss  Work on this risk factor aggressively with your PCP             _X__Physical Inactivity: exercise plan at time of stroke - Counseled on initiating 30 minutes of moderate exercise most days, but at least three times per week. Moderate exercise can be walking, swimming, cycling.  Work up to goal by setting realistic goal and then increasing it weekly or monthly.    _X__Atrial Fibrillation: newly diagnosed with afib.  Has appointment scheduled with cardiology    _NO__Sleep Apnea: never diagnosed with sleep apnea.     If you snore and have episodes where you stop breathing, speak to your PCP about whether a pulse oximetry test or referral for a sleep study are needed.   Untreated sleep apnea is associated with recurrent stroke    __No_Alcohol or Drug Abuse: Current user of alcohol.  Counseled to avoid binge drinking and men can have a maximum of 2 drinks daily and women 1 Drink maximum per day - preferably red wine;  Recreational Drug use:  Denies    _X__Testosterone Use: contact Dr. Serra.    Current Use - recommend discontinuation as it is associated with increased risk of stroke.  If discontinuing causes reduced quality of life due to symptoms, work with Dr. Serra to take the very lowest dose needed to manage the symptoms (hot flashes, fatigue, etc)        Plan: CT shows chronic lacunar stroke  and new stroke of unknown mechanism.      Ischemic stroke - mechanism likely atrial fibrillation - now on eliquis.  Continue taking indefinitely.  Contact PCP or Emergency room if you have sudden bleeding in urine or with bowel movements.    Work with PCP on risk factors checked above to reduce risk of recurrent stroke.    Therapies:  Home health and therapies already set up.  Talk to home health nurse about physical therapy for balance due to cerebellar stroke.     Counseled on when to call 911 and if desired additional information on stroke was provided    Return to our office: 1-2 weeks after carotid ultrasound or I can mychart you the results.  I will order this and they will call you to schedule.                [employer] Access Code: Activation code not generated  Current [employer] Status: Active          Quit Tobacco Information     Do you want to quit using tobacco?    Quitting tobacco decreases risks of cancer, heart and lung disease, increases life expectancy, improves sense of taste and smell, and increases spending money, among other benefits.    If you are thinking about quitting, we can help.  • [facility] Quit Tobacco Program: [phone]  o Program occurs weekly for four weeks and includes pharmacist consultation on products to support quitting smoking or chewing tobacco. A provider referral is needed for pharmacist consultation.  • Tobacco Users Help Hotline: 8-800-QUIT-NOW (917-1650) or https://[place].quitlogix.org/  o Free, confidential telephone and online coaching for [place] residents. Sessions are designed on a schedule that is convenient for you. Eligible clients receive free nicotine replacement therapy.  • Nationally: www.smokefree.gov  o Information and professional assistance to support both immediate and long-term needs as you become, and remain, a non-smoker. Smokefree.gov allows you to choose the help that best fits your needs.

## 2017-08-14 NOTE — PROGRESS NOTES
"Subjective:      Jed Cramer is a 77 y.o. male who presents with New Patient    Patient had a possible stroke  and was discharged from the [facility] in early July 2017.  2010 after bladder surgery he developed left facial droop and balance impairment.  Wife also reports episode of him losing vision and having droopy face for hours.    Symptoms associated with this episode:  confusion.      Symptoms have resolved except he continues to struggle with word finding issues.  States he has problems with his balance but this is not new.    Patient was not seen in the hospital by neurology.     Stroke Prevention Medications taking currently: apixaban  (Prior to this event patient was taking following anti-thrombotic: aspirin)    (PCP) Primary Doctor:  Yuni    [facility] reviewed    Medications and Allergies reviewed    Social: lives in [place] with his wife and two sons   Works  - retired from [employer]    Test Results Reviewed:    MRI:  Cannot tolerate due to severe claustrophobia    Echo: no clot    CUS:  Need to get - I will order this test and they will call you to schedule    CT:  Old lacunar strokes in left caudate head and left cerebellar    DX:  Stroke clinically and also hx of strokes     Personal Risk factors associated with recurrent stroke:    Some risk factors for stroke are \"non-modifiable\" meaning we cannot change them.  Examples of these types of risk factors are:    *   Age - once we turn 55, stroke becomes more common.  It is associated with aging and in fact every decade over 55 our stroke risk doubles.  * Gender - Men have more strokes than women, although this gender difference is only slight  * Ethnicity and/or family history:  if your parents, grandparents, siblings or children have had stroke or if you have ancestors of , , or  descent, you may have inherited some genes pre-disposing your toward stroke    To reduce your risk of recurrent stroke, we want to " focus on risk factors we can modify.  Any of the checked items below are your personal risk factors and you should work with your PCP (primary care provider) to get them to the goal (goals are in bold).   These risk factors are:     _NO__Diabetes - denies    _X__Hypertension - goal is <140 .   Current: 136/80  Recommend taking bp once daily and taking log to PCP.    Continue current Blood pressure medication - avoid salt    _X__Hyperlipidemia - goal LDL is <70.  Current: 67    Continue taking cholesterol medication as prescribed.  Modify diet to reduce cholesterol - less cheese, avoid egg yolks, reduce intake of pork, shrimp, and beef.    _X__Smoking: Once you are 5 years out from when you quit smoking, the history of smoking does not appear to be a significant risk factor for recurrent stroke.  If you are quitting, implement stress reduction with exercise such as daily walking, yoga, or meditation.  If you are unable to quit on your own, ask your PCP about chantix or referral for smoking cessation    _X__Overweight:  Current BMI 32.  Continue to work toward weight loss    Body Mass Index (BMI) is your height compared to your weight.  Goal BMI to reduce risk of recurrent stroke is <25   BMI 25-27 diet/exercise to get to goal BMI.    BMI >27 patient should begin with goal of 10% weight loss  Work on this risk factor aggressively with your PCP             _X__Physical Inactivity: exercise plan at time of stroke - Counseled on initiating 30 minutes of moderate exercise most days, but at least three times per week. Moderate exercise can be walking, swimming, cycling.  Work up to goal by setting realistic goal and then increasing it weekly or monthly.    _X__Atrial Fibrillation: newly diagnosed with afib.  Has appointment scheduled with cardiology    _NO__Sleep Apnea: never diagnosed with sleep apnea.     If you snore and have episodes where you stop breathing, speak to your PCP about whether a pulse oximetry test or  "referral for a sleep study are needed.   Untreated sleep apnea is associated with recurrent stroke    __No_Alcohol or Drug Abuse: Current user of alcohol.  Counseled to avoid binge drinking and men can have a maximum of 2 drinks daily and women 1 Drink maximum per day - preferably red wine;  Recreational Drug use:  Denies    _X__Testosterone Use: contact Dr. Serra.    Current Use - recommend discontinuation as it is associated with increased risk of stroke.  If discontinuing causes reduced quality of life due to symptoms, work with Dr. Serra to take the very lowest dose needed to manage the symptoms (hot flashes, fatigue, etc)                        HPI    ROS       Objective:     /80 mmHg  Pulse 67  Temp(Src) 36.4 °C (97.6 °F)  Resp 18  Ht 1.778 m (5' 10\")  Wt 101.152 kg (223 lb)  BMI 32.00 kg/m2  SpO2 97%     Physical Exam   Constitutional: He is oriented to person, place, and time. He appears well-developed and well-nourished.   Pulmonary/Chest: Effort normal.   Neurological: He is oriented to person, place, and time. A cranial nerve deficit is present. Coordination abnormal.   Left facial droop  Left arm and leg weakness  Normal left arm sensation  Walks with walker due to impaired balance   Skin: Skin is warm and dry.                 Assessment/Plan:     Plan: CT shows chronic lacunar stroke and new stroke of unknown mechanism.      Ischemic stroke - mechanism likely atrial fibrillation - now on eliquis.  Continue taking indefinitely.  Contact PCP or Emergency room if you have sudden bleeding in urine or with bowel movements.    Work with PCP on risk factors checked above to reduce risk of recurrent stroke.    Therapies:  Home health and therapies already set up.  Talk to home health nurse about physical therapy for balance due to cerebellar stroke.     Counseled on when to call 911 and if desired additional information on stroke was provided    Return to our office: 1-2 weeks after carotid " ultrasound or I can mychart you the results.  I will order this and they will call you to schedule.      Total time with this visit: 45    Minutes face-to-face with patient. More than 50% of this visit was spent educating patient on their illness and/or coordinating care, as detailed above

## 2017-08-14 NOTE — PATIENT INSTRUCTIONS
"DX:  Stroke clinically and also hx of strokes     Personal Risk factors associated with recurrent stroke:    Some risk factors for stroke are \"non-modifiable\" meaning we cannot change them.  Examples of these types of risk factors are:    *   Age - once we turn 55, stroke becomes more common.  It is associated with aging and in fact every decade over 55 our stroke risk doubles.  * Gender - Men have more strokes than women, although this gender difference is only slight  * Ethnicity and/or family history:  if your parents, grandparents, siblings or children have had stroke or if you have ancestors of , , or  descent, you may have inherited some genes pre-disposing your toward stroke    To reduce your risk of recurrent stroke, we want to focus on risk factors we can modify.  Any of the checked items below are your personal risk factors and you should work with your PCP (primary care provider) to get them to the goal (goals are in bold).   These risk factors are:     _NO__Diabetes - denies    _X__Hypertension - goal is <140 .   Current: 136/80  Recommend taking bp once daily and taking log to PCP.    Continue current Blood pressure medication - avoid salt    _X__Hyperlipidemia - goal LDL is <70.  Current: 67    Continue taking cholesterol medication as prescribed.  Modify diet to reduce cholesterol - less cheese, avoid egg yolks, reduce intake of pork, shrimp, and beef.    _X__Smoking: Once you are 5 years out from when you quit smoking, the history of smoking does not appear to be a significant risk factor for recurrent stroke.  If you are quitting, implement stress reduction with exercise such as daily walking, yoga, or meditation.  If you are unable to quit on your own, ask your PCP about chantix or referral for smoking cessation    _X__Overweight:  Current BMI 32.  Continue to work toward weight loss    Body Mass Index (BMI) is your height compared to your weight.  Goal BMI to " reduce risk of recurrent stroke is <25   BMI 25-27 diet/exercise to get to goal BMI.    BMI >27 patient should begin with goal of 10% weight loss  Work on this risk factor aggressively with your PCP             _X__Physical Inactivity: exercise plan at time of stroke - Counseled on initiating 30 minutes of moderate exercise most days, but at least three times per week. Moderate exercise can be walking, swimming, cycling.  Work up to goal by setting realistic goal and then increasing it weekly or monthly.    _X__Atrial Fibrillation: newly diagnosed with afib.  Has appointment scheduled with cardiology    _NO__Sleep Apnea: never diagnosed with sleep apnea.     If you snore and have episodes where you stop breathing, speak to your PCP about whether a pulse oximetry test or referral for a sleep study are needed.   Untreated sleep apnea is associated with recurrent stroke    __No_Alcohol or Drug Abuse: Current user of alcohol.  Counseled to avoid binge drinking and men can have a maximum of 2 drinks daily and women 1 Drink maximum per day - preferably red wine;  Recreational Drug use:  Denies    _X__Testosterone Use: contact Dr. Serra.    Current Use - recommend discontinuation as it is associated with increased risk of stroke.  If discontinuing causes reduced quality of life due to symptoms, work with Dr. Serra to take the very lowest dose needed to manage the symptoms (hot flashes, fatigue, etc)        Plan: CT shows chronic lacunar stroke and new stroke of unknown mechanism.      Ischemic stroke - mechanism likely atrial fibrillation - now on eliquis.  Continue taking indefinitely.  Contact PCP or Emergency room if you have sudden bleeding in urine or with bowel movements.    Work with PCP on risk factors checked above to reduce risk of recurrent stroke.    Therapies:  Home health and therapies already set up.  Talk to home health nurse about physical therapy for balance due to cerebellar stroke.     Counseled on when  to call 911 and if desired additional information on stroke was provided    Return to our office: 1-2 weeks after carotid ultrasound or I can mychart you the results.  I will order this and they will call you to schedule.

## 2017-08-16 ENCOUNTER — APPOINTMENT (OUTPATIENT)
Dept: RADIOLOGY | Facility: MEDICAL CENTER | Age: 77
End: 2017-08-16
Attending: PHYSICIAN ASSISTANT
Payer: MEDICARE

## 2017-08-17 DIAGNOSIS — R10.13 EPIGASTRIC PAIN: ICD-10-CM

## 2017-08-17 DIAGNOSIS — I10 ESSENTIAL HYPERTENSION: ICD-10-CM

## 2017-08-17 DIAGNOSIS — I63.9 CEREBROVASCULAR ACCIDENT (CVA), UNSPECIFIED MECHANISM (HCC): ICD-10-CM

## 2017-08-17 DIAGNOSIS — G89.4 CHRONIC PAIN SYNDROME: ICD-10-CM

## 2017-08-17 DIAGNOSIS — Z85.51 H/O PRIMARY MALIGNANT NEOPLASM OF URINARY BLADDER: ICD-10-CM

## 2017-08-17 DIAGNOSIS — E66.9 OBESITY (BMI 30-39.9): ICD-10-CM

## 2017-08-17 RX ORDER — FOLIC ACID 1 MG/1
1 TABLET ORAL 2 TIMES DAILY
Qty: 60 TAB | Refills: 3 | Status: SHIPPED | OUTPATIENT
Start: 2017-08-17 | End: 2018-05-02

## 2017-09-07 ENCOUNTER — TELEPHONE (OUTPATIENT)
Dept: MEDICAL GROUP | Facility: PHYSICIAN GROUP | Age: 77
End: 2017-09-07

## 2017-09-07 DIAGNOSIS — R26.9 GAIT DISTURBANCE: ICD-10-CM

## 2017-10-17 ENCOUNTER — TELEPHONE (OUTPATIENT)
Dept: MEDICAL GROUP | Facility: PHYSICIAN GROUP | Age: 77
End: 2017-10-17

## 2017-10-17 NOTE — TELEPHONE ENCOUNTER
Per patient stated that at his last visit you recommended for him to do physical therapy could help him once his surgeries are all over with, which they are now.

## 2017-10-18 NOTE — TELEPHONE ENCOUNTER
Spoke to patient informed patient of message below. Patient understood and thanked me for the call.

## 2017-10-19 ENCOUNTER — TELEPHONE (OUTPATIENT)
Dept: MEDICAL GROUP | Facility: PHYSICIAN GROUP | Age: 77
End: 2017-10-19

## 2017-10-20 NOTE — TELEPHONE ENCOUNTER
Patient is taking eliquist and is in the donut hole with medicare, is there a generic he can take that is cheaper.

## 2017-10-24 NOTE — TELEPHONE ENCOUNTER
Pt called on Friday and spoke with me. I informed madeline and madeline told me to let them know they had two options. Either go see madeline at the main hospital and get it fixed or patient could be on coumadin. Neither of these worked for patient so patient bought his eloquist and has an appt with madeline on wednesday

## 2017-10-25 ENCOUNTER — ANTICOAGULATION MONITORING (OUTPATIENT)
Dept: VASCULAR LAB | Facility: MEDICAL CENTER | Age: 77
End: 2017-10-25

## 2017-10-25 NOTE — PROGRESS NOTES
[facility] (Dr. Morrissey ) for samples of Eliquis 5mg   4 weeks worth given  Maria Del Carmen Benoit, SantiagoD

## 2017-11-08 ENCOUNTER — HOSPITAL ENCOUNTER (OUTPATIENT)
Facility: MEDICAL CENTER | Age: 77
End: 2017-11-08
Attending: COLON & RECTAL SURGERY | Admitting: COLON & RECTAL SURGERY
Payer: MEDICARE

## 2017-11-08 VITALS
HEART RATE: 62 BPM | BODY MASS INDEX: 27.76 KG/M2 | TEMPERATURE: 98.2 F | OXYGEN SATURATION: 100 % | HEIGHT: 74 IN | RESPIRATION RATE: 17 BRPM | WEIGHT: 216.27 LBS

## 2017-11-08 LAB
BASE EXCESS BLDV CALC-SCNC: 2 MMOL/L (ref -4–3)
BODY TEMPERATURE: ABNORMAL DEGREES
CA-I BLD ISE-SCNC: 1.27 MMOL/L (ref 1.1–1.3)
CO2 BLDV-SCNC: 25 MMOL/L (ref 20–33)
GLUCOSE BLD-MCNC: 99 MG/DL (ref 65–99)
HCO3 BLDV-SCNC: 24.1 MMOL/L (ref 24–28)
HCT VFR BLD CALC: 58 % (ref 42–52)
HGB BLD-MCNC: 19.7 G/DL (ref 14–18)
PCO2 BLDV: 31.3 MMHG (ref 41–51)
PH BLDV: 7.49 [PH] (ref 7.31–7.45)
PO2 BLDV: 17 MMHG (ref 25–40)
POTASSIUM BLD-SCNC: 5.6 MMOL/L (ref 3.6–5.5)
SAO2 % BLDV: 28 %
SODIUM BLD-SCNC: 141 MMOL/L (ref 135–145)
SPECIMEN DRAWN FROM PATIENT: ABNORMAL

## 2017-11-08 PROCEDURE — 88304 TISSUE EXAM BY PATHOLOGIST: CPT

## 2017-11-08 PROCEDURE — 501838 HCHG SUTURE GENERAL: Performed by: COLON & RECTAL SURGERY

## 2017-11-08 PROCEDURE — A9270 NON-COVERED ITEM OR SERVICE: HCPCS

## 2017-11-08 PROCEDURE — 84132 ASSAY OF SERUM POTASSIUM: CPT

## 2017-11-08 PROCEDURE — 700102 HCHG RX REV CODE 250 W/ 637 OVERRIDE(OP)

## 2017-11-08 PROCEDURE — 82947 ASSAY GLUCOSE BLOOD QUANT: CPT

## 2017-11-08 PROCEDURE — 160027 HCHG SURGERY MINUTES - 1ST 30 MINS LEVEL 2: Performed by: COLON & RECTAL SURGERY

## 2017-11-08 PROCEDURE — 160009 HCHG ANES TIME/MIN: Performed by: COLON & RECTAL SURGERY

## 2017-11-08 PROCEDURE — 700111 HCHG RX REV CODE 636 W/ 250 OVERRIDE (IP)

## 2017-11-08 PROCEDURE — 82803 BLOOD GASES ANY COMBINATION: CPT

## 2017-11-08 PROCEDURE — 160002 HCHG RECOVERY MINUTES (STAT): Performed by: COLON & RECTAL SURGERY

## 2017-11-08 PROCEDURE — 160036 HCHG PACU - EA ADDL 30 MINS PHASE I: Performed by: COLON & RECTAL SURGERY

## 2017-11-08 PROCEDURE — 160035 HCHG PACU - 1ST 60 MINS PHASE I: Performed by: COLON & RECTAL SURGERY

## 2017-11-08 PROCEDURE — 84295 ASSAY OF SERUM SODIUM: CPT

## 2017-11-08 PROCEDURE — 85014 HEMATOCRIT: CPT

## 2017-11-08 PROCEDURE — A6403 STERILE GAUZE>16 <= 48 SQ IN: HCPCS | Performed by: COLON & RECTAL SURGERY

## 2017-11-08 PROCEDURE — 82330 ASSAY OF CALCIUM: CPT

## 2017-11-08 PROCEDURE — 160048 HCHG OR STATISTICAL LEVEL 1-5: Performed by: COLON & RECTAL SURGERY

## 2017-11-08 PROCEDURE — 700101 HCHG RX REV CODE 250

## 2017-11-08 RX ORDER — BUPROPION HYDROCHLORIDE 100 MG/1
100 TABLET ORAL 2 TIMES DAILY
COMMUNITY
End: 2018-02-01 | Stop reason: SDUPTHER

## 2017-11-08 RX ORDER — OXYCODONE HYDROCHLORIDE 15 MG/1
2 TABLET, FILM COATED, EXTENDED RELEASE ORAL 2 TIMES DAILY PRN
COMMUNITY

## 2017-11-08 RX ORDER — BUPIVACAINE HYDROCHLORIDE AND EPINEPHRINE 5; 5 MG/ML; UG/ML
INJECTION, SOLUTION EPIDURAL; INTRACAUDAL; PERINEURAL
Status: DISCONTINUED | OUTPATIENT
Start: 2017-11-08 | End: 2017-11-08 | Stop reason: HOSPADM

## 2017-11-08 RX ORDER — LIDOCAINE HYDROCHLORIDE 10 MG/ML
INJECTION, SOLUTION INFILTRATION; PERINEURAL
Status: DISCONTINUED
Start: 2017-11-08 | End: 2017-11-08 | Stop reason: HOSPADM

## 2017-11-08 RX ORDER — LIDOCAINE HYDROCHLORIDE 10 MG/ML
INJECTION, SOLUTION INFILTRATION; PERINEURAL
Status: COMPLETED
Start: 2017-11-08 | End: 2017-11-08

## 2017-11-08 RX ORDER — OXYCODONE HCL 5 MG/5 ML
SOLUTION, ORAL ORAL
Status: COMPLETED
Start: 2017-11-08 | End: 2017-11-08

## 2017-11-08 RX ORDER — SODIUM CHLORIDE, SODIUM LACTATE, POTASSIUM CHLORIDE, CALCIUM CHLORIDE 600; 310; 30; 20 MG/100ML; MG/100ML; MG/100ML; MG/100ML
INJECTION, SOLUTION INTRAVENOUS CONTINUOUS
Status: DISCONTINUED | OUTPATIENT
Start: 2017-11-08 | End: 2017-11-08 | Stop reason: HOSPADM

## 2017-11-08 RX ORDER — HYDROCODONE BITARTRATE AND ACETAMINOPHEN 5; 325 MG/1; MG/1
1-2 TABLET ORAL EVERY 4 HOURS PRN
Qty: 20 TAB | Refills: 0 | Status: SHIPPED | OUTPATIENT
Start: 2017-11-08

## 2017-11-08 RX ADMIN — OXYCODONE HYDROCHLORIDE 15 MG: 5 SOLUTION ORAL at 10:52

## 2017-11-08 RX ADMIN — LIDOCAINE HYDROCHLORIDE 0.3 ML: 10 INJECTION, SOLUTION INFILTRATION; PERINEURAL at 09:00

## 2017-11-08 RX ADMIN — SODIUM CHLORIDE, SODIUM LACTATE, POTASSIUM CHLORIDE, CALCIUM CHLORIDE: 600; 310; 30; 20 INJECTION, SOLUTION INTRAVENOUS at 10:15

## 2017-11-08 ASSESSMENT — PAIN SCALES - GENERAL
PAINLEVEL_OUTOF10: 4
PAINLEVEL_OUTOF10: 0

## 2017-11-08 NOTE — OP REPORT
DATE OF SERVICE:  11/08/2017    PREOPERATIVE DIAGNOSES:  1.  Chronic anorectal pain syndrome.  2.  Chronic recurrent posterior midline anal fissure.  3.  Large symptomatic hypertrophic papilla.    POSTOPERATIVE DIAGNOSES:    1.  Chronic anorectal pain syndrome.  2.  Chronic recurrent posterior midline anal fissure.  3.  Large symptomatic hypertrophic papilla.    OPERATION PERFORMED:  1.  Rigid proctoscopy.  2.  Excision of hypertrophic papilla.  3.  Endorectal advancement flap.    SURGEON:  Ephraim Piedra MD    ANESTHESIOLOGIST:  Liam Tang MD    INDICATIONS FOR PROCEDURE:  The patient has been battling with chronic   anorectal pain for more than 3 years now.  He has had a lateral internal   sphincterotomy procedure for chronic anal fissure, but continues to have   constant unremitting pain.  He comes today for examination under anesthesia   and surgical treatment of a conservative nature.    DETAILS OF PROCEDURE:  After an extensive informed consent discussion process,   the patient was brought to the operating room.  He was placed in a supine   position on the operating table.  After induction of general anesthesia, he   was repositioned into lithotomy with Yellofin stirrups, sequential compression   stockings and appropriate padding.  The anorectal region was prepped and   draped in usual fashion.  After administration of intravenous antibiotics, a   careful examination under anesthesia was performed.  This demonstrated   somewhat lax, but within normal limits likely tone with no rectal masses.  A   posterior midline partially epithelialized anal fissure was present.  A large   posterior midline hypertrophic papilla was present.  The papilla was excised   sharply and passed off the field for pathology with the base touched up with   cautery.    A small flap was raised to allow advancement of some nice healthy rectal   mucosa over the fissure and this was secured in place with chromic sutures   with a wide  U-shaped type flap advanced approximately a cm.  Bupivacaine with   epinephrine was infiltrated into both areas.  Careful examination demonstrated   no other untoward events or findings.    Needle, sponge and instrument counts were correct.    ESTIMATED BLOOD LOSS:  Minimal.    COMPLICATIONS:  None.       ____________________________________     MD LUCERO HERZOG / MARLENY    DD:  11/08/2017 12:01:47  DT:  11/08/2017 12:42:53    D#:  [id number]  Job#:  [id number]

## 2017-11-08 NOTE — PROGRESS NOTES
Med rec partially complete per pt   Allergies reviewed  > Pt on unknown pain pump . Pt only knows it has dilaudid in it.   Pain doctor fills pain pump.

## 2017-11-08 NOTE — DISCHARGE INSTRUCTIONS
ACTIVITY: Rest and take it easy for the first 24 hours.  A responsible adult is recommended to remain with you during that time.  It is normal to feel sleepy.  We encourage you to not do anything that requires balance, judgment or coordination.    MILD FLU-LIKE SYMPTOMS ARE NORMAL. YOU MAY EXPERIENCE GENERALIZED MUSCLE ACHES, THROAT IRRITATION, HEADACHE AND/OR SOME NAUSEA.    FOR 24 HOURS DO NOT:  Drive, operate machinery or run household appliances.  Drink beer or alcoholic beverages.   Make important decisions or sign legal documents.    SPECIAL INSTRUCTIONS: *Anorectal Procedure Post-Op Instructions:     FOLLOW UP IN 3-4 WEEKS.       1. DIET: Upon discharge from the hospital start with light fluids then resume your normal preoperative diet. Depending on how you are feeling and whether you have nausea or not, you may wish to stay with a bland diet for the first few days. However, you can advance this as quickly as you feel ready.  Avoid foods that you know will cause constipation.  Stick with soft, bland foods that have been easy to digest in the past.     2. ACTIVITIES: Limit your activity for the first 5-7 days following surgery.  You may drive whenever you are off pain medications and are able to perform the activities needed to drive, i.e. turning, bending, twisting, etc.     3. BATHING/WOUND CARE: If your wound contains packing, when the packing falls out, you may leave it out or remove it yourself the day after surgery. If there is a drain in place this will stay in place until your follow up appointment. You will pass some blood and mucus for the first week or more, with or without bowel movements.  Temporary loss of bowel control and changes in rectal sensation is common due to swelling.  You can wear a pad or disposable undergarment to protect your clothes.  Lumps or tags always form and these are best left alone for twelve months, as they usually resolve with time.  You may get the wound wet at any  time after leaving the hospital. See instructions for Sitz baths below.     4. BOWEL FUNCTION: It is very important to keep your bowel movements soft.  Pain mediation and lack of activity will causes constipation.  Take a stool softener either prescribed or over the counter and make sure you are drinking about 64 oz (1/2 gallon) of water everyday.  Taking a fiber supplement like Benefiber or metamucil on a daily basis also helps.  Continue this practice even after you have fully recovered to keep your stools soft and to avoid straining.  If you have not moved your bowels by day 3 after surgery; take Rina lax, Milk of magnesia, or another laxative until you have a bowel movement.  DO NOT use suppositories or enemas     6. PAIN MEDICATION: You can expect a lot of pain after Anorectal surgery.  The effects of the local anesthesia used during your surgery may wear off as early as about six hours following surgery.  Make sure you get your pain medication prescription filled. Please take these as directed and do not wait until the pain is excruciating.  Frequent Sitz baths (see instructions below) to help with pain and spasms. It is important to remember not to take medications on an empty stomach as this may cause nausea.  Topical ointments may be prescribed or over the counter may be mildly helpful as well.  All of these methods may help you feel more comfortable, but none of them will completely relieve the pain.  You should notice a very slow gradual improvement over the first few weeks.  Only time will help you heal and relieve the pain.  If pain becomes severe and you are unable to control it with the above methods you may need to go to the emergency room for IV pain control.  If you need a pain medication refill please call the office during business hours.     7.CALL IF YOU HAVE: (1) Fevers to more than 101.50 F, (2) Unusual chest or leg pain, (3) Drainage or fluid from incision that may be foul smelling,  increased tenderness or soreness at the wound or the wound edges are no longer together, redness or swelling at the incision site. Please do not hesitate to call with any other questions.     8. APPOINTMENT: Contact our office at [phone] for a follow-up appointment in 3-4 weeks following your procedure.     SITZ BATHS:     First, you prepare some things such as large shallow plastic container, hot water, towels, and blanket. You can find large bowl which is approximately 8 inches depth. Another option is using bathtub but you may not be comfortable since sitz bath is more for use in submerging the buttocks and hip area not the legs. You can also buy the sitz bathtub which is available in the market. The bathtub products come in various options. They are equipped with different features. You just need to find the best product which matches with your needs. By using sitz bathtub, sitz bath at home can be done easier.     If you are using bowl or basin, you should place the bowl or basin in the bathtub or on a towel on the floor. It is better for you to use the easiest one for you to get up from. Next, you can fill the bowl one third full of medium hot water. You can use your elbow to test the water temperature. Make sure that the water temperature is in the comfortable level. You should not use your hands since they can tolerate higher temperature.     During sitz bath, you should keep a towel and blanket since they are handy to solve the spillage. They are also useful when you need warmth. To start the bath, you can lower yourself in the tub gently. Sit in the tub for 10-20 minutes or until the water cools considerably. If it becomes uncomfortable, you have to get out of the bath.     You can use sitz bath with hot water, cold water, or alternating between the two for maximum healing ability.     If you have any additional questions, please do not hesitate to call the office and speak to either myself or the  physician on call.     Office address:    [address], [address], [place] [address]     [facility]   [phone]**      You should CALL YOUR PHYSICIAN if you develop:  Fever greater than 101 degrees F.  Pain not relieved by medication, or persistent nausea or vomiting.  Excessive bleeding (blood soaking through dressing) or unexpected drainage from the wound.  Extreme redness or swelling around the incision site, drainage of pus or foul smelling drainage.  Inability to urinate or empty your bladder within 8 hours.  Problems with breathing or chest pain.    You should call 911 if you develop problems with breathing or chest pain.  If you are unable to contact your doctor or surgical center, you should go to the nearest emergency room or urgent care center.  Physician's telephone #: *[phone]**    If any questions arise, call your doctor.  If your doctor is not available, please feel free to call the Surgical Center at [phone].  The Center is open Monday through Friday from 7AM to 7PM.  You can also call the [employer] HOTLINE open 24 hours/day, 7 days/week and speak to a nurse at [phone], or toll free at [phone].    A registered nurse may call you a few days after your surgery to see how you are doing after your procedure.    MEDICATIONS: Resume taking daily medication.  Take prescribed pain medication with food.  If no medication is prescribed, you may take non-aspirin pain medication if needed.  PAIN MEDICATION CAN BE VERY CONSTIPATING.  Take a stool softener or laxative such as senokot, pericolace, or milk of magnesia if needed.    Prescription given for *Norco**.  Last pain medication given at **10:52am*.    If your physician has prescribed pain medication that includes Acetaminophen (Tylenol), do not take additional Acetaminophen (Tylenol) while taking the prescribed medication.    Depression / Suicide Risk    As you are discharged from this [facility]  facility, it is important to learn how to keep safe from harming yourself.    Recognize the warning signs:  · Abrupt changes in personality, positive or negative- including increase in energy   · Giving away possessions  · Change in eating patterns- significant weight changes-  positive or negative  · Change in sleeping patterns- unable to sleep or sleeping all the time   · Unwillingness or inability to communicate  · Depression  · Unusual sadness, discouragement and loneliness  · Talk of wanting to die  · Neglect of personal appearance   · Rebelliousness- reckless behavior  · Withdrawal from people/activities they love  · Confusion- inability to concentrate     If you or a loved one observes any of these behaviors or has concerns about self-harm, here's what you can do:  · Talk about it- your feelings and reasons for harming yourself  · Remove any means that you might use to hurt yourself (examples: pills, rope, extension cords, firearm)  · Get professional help from the community (Mental Health, Substance Abuse, psychological counseling)  · Do not be alone:Call your Safe Contact- someone whom you trust who will be there for you.  · Call your local CRISIS HOTLINE 476-8643 or 298-371-3537  · Call your local Children's Mobile Crisis Response Team [place] (596) 285-8879 or www.Blackboard  · Call the toll free National Suicide Prevention Hotlines   · National Suicide Prevention Lifeline 584-767-EFHW (4548)  · National Hope Line Network 800-SUICIDE (477-4316)

## 2017-12-19 ENCOUNTER — TELEPHONE (OUTPATIENT)
Dept: MEDICAL GROUP | Facility: PHYSICIAN GROUP | Age: 77
End: 2017-12-19

## 2017-12-19 ENCOUNTER — PATIENT OUTREACH (OUTPATIENT)
Dept: HEALTH INFORMATION MANAGEMENT | Facility: OTHER | Age: 77
End: 2017-12-19

## 2017-12-19 NOTE — LETTER
December 19, 2017        Jed Cramer  [address] [address]        Dear Jed:    Your Care Coordinator feels you would benefit from a medication review with a pharmacist. I am sorry I have been unable to reach you via phone. As a clinical pharmacist with [facility], I am working with your health care providers to ensure optimal medication therapy. This service is available to you at no cost.  By participating in this review, I will:     • Review your medications, vitamins and other over-the-counter items.    • Assure there are no harmful interactions with your medications.  • Lower your out-of-pocket prescription costs, if possible.   • Communicate medication concerns between your healthcare specialists.  • Provide you with wellness, healthy lifestyle, and disease prevention strategies.  • Refer you to a nurse or  if needed.  • Consider remote health monitoring if you have high blood pressure.  • Answer any questions that you may have about your medications.    Please contact me at [phone] to discuss your medications. I am available Monday through Friday from 8am to 5pm. I look forward to hearing from you.     Sincerely,        Alma Pham, PharmD    Electronically Signed

## 2017-12-20 ENCOUNTER — ANTICOAGULATION MONITORING (OUTPATIENT)
Dept: VASCULAR LAB | Facility: MEDICAL CENTER | Age: 77
End: 2017-12-20

## 2017-12-20 NOTE — PROGRESS NOTES
Patient presents to [facility] for Eliquis samples  28 days given  Maria Del Carmen Benoit, PharmD

## 2017-12-20 NOTE — PROGRESS NOTES
Outbound call to patient for med rec. Left  for patient to call [phone].  1st attempt to reach patient.  Letter via [facility].

## 2017-12-27 DIAGNOSIS — I63.9 CEREBROVASCULAR ACCIDENT (CVA), UNSPECIFIED MECHANISM (HCC): ICD-10-CM

## 2017-12-27 DIAGNOSIS — G89.4 CHRONIC PAIN SYNDROME: ICD-10-CM

## 2017-12-27 DIAGNOSIS — R10.13 EPIGASTRIC PAIN: ICD-10-CM

## 2017-12-27 DIAGNOSIS — E66.9 OBESITY (BMI 30-39.9): ICD-10-CM

## 2017-12-27 DIAGNOSIS — I10 ESSENTIAL HYPERTENSION: ICD-10-CM

## 2017-12-27 DIAGNOSIS — Z85.51 H/O PRIMARY MALIGNANT NEOPLASM OF URINARY BLADDER: ICD-10-CM

## 2017-12-27 NOTE — TELEPHONE ENCOUNTER
Was the patient seen in the last year in this department? Yes     Does patient have an active prescription for medications requested? No     Received Request Via: Pharmacy   Last seen  8/14/17  Last labs   11/8/17

## 2018-01-16 ENCOUNTER — PATIENT OUTREACH (OUTPATIENT)
Dept: HEALTH INFORMATION MANAGEMENT | Facility: OTHER | Age: 78
End: 2018-01-16

## 2018-01-16 NOTE — PROGRESS NOTES
1. Attempt #: 1    2. HealthConnect Verified: yes    3. Verify PCP: yes    4. Care Team Updated:       •   DME Company (gait device, O2, CPAP, etc.): YES       •   Other Specialists (eye doctor, derm, GYN, cardiology, endo, etc): YES    5.  Reviewed/Updated the following with patient:       •   Communication Preference Obtained? YES       •   Preferred Pharmacy? YES       •   Preferred Lab? YES       •   Family History (document living status of immediate family members and if + hx of cancer, diabetes, hypertension, hyperlipidemia, heart attack, stroke) YES. Was Abstract Encounter opened and chart updated? YES    6. [facility] Activation: already active    7. [facility] Ally: no    8. Annual Wellness Visit Scheduling  Scheduling Status:Scheduled      9. Care Gap Scheduling (Attempt to Schedule EACH Overdue Care Gap!)     Health Maintenance Due   Topic Date Due   • Annual Wellness Visit  1940   • IMM DTaP/Tdap/Td Vaccine (1 - Tdap) 02/27/1961   • IMM ZOSTER VACCINE  02/27/2002   • IMM PNEUMOCOCCAL 65+ (ADULT) LOW/MEDIUM RISK SERIES (2 of 2 - PCV13) 04/24/2014   • IMM INFLUENZA (1) 09/01/2017        Scheduled patient for Annual Wellness Visit      10. Patient was advised: “This is a free wellness visit. The provider will screen for medical conditions to help you stay healthy. If you have other concerns to address you may be asked to discuss these at a separate visit or there may be an additional fee.”     11. Patient was informed to arrive 15 min prior to their scheduled appointment and bring in their medication bottles.

## 2018-01-25 ENCOUNTER — TELEPHONE (OUTPATIENT)
Dept: MEDICAL GROUP | Facility: PHYSICIAN GROUP | Age: 78
End: 2018-01-25

## 2018-01-26 NOTE — TELEPHONE ENCOUNTER
Pt wife called requesting a call back with results. The message was unclear, I attempted to call back and left a message to return my call. Wanting to know what results due to results being read and it being in November.

## 2018-01-26 NOTE — TELEPHONE ENCOUNTER
Pt has been calling since 01/19/2018 regarding an issue with her husbands tests/results. This is the background Mari gave me regarding this issue.    The pt did the MRI that Dr. Parrish ordered in Nov.17, that came back with no signs of an stroke so he wanted to run another MRI to look into this further. He ordered labs and MRI of the spine (notes in ), pt did that at [facility] on 12/28/2017. The pt had an appointment with Dr. Parrish on 01/18/2018 and the wife mentioned that Dr. Parrish asked about a heredity blood disorder.. The pt had no idea what the wife was talking about and then Dr. Parrish stated he wanted to draw more labs. The pt was never called regarding the new labs or the results from the previous MRI. Mari would like to know if we can discuss this with her due to them not receiving any answers from Dr. Oliver office.     I attempted to call their office, but they are closed and will not be back open until Monday 01/29/2018. When they are open I will ask for his last 2 visit notes.

## 2018-01-26 NOTE — TELEPHONE ENCOUNTER
Called pt, left message on phone to call back to make an appointment if they would like to discuss this and to get records released.

## 2018-01-27 NOTE — TELEPHONE ENCOUNTER
Wife called back, spoke to her regarding the appointment and made an appt for them to come in and discuss this.

## 2018-02-01 ENCOUNTER — OFFICE VISIT (OUTPATIENT)
Dept: MEDICAL GROUP | Facility: PHYSICIAN GROUP | Age: 78
End: 2018-02-01
Payer: MEDICARE

## 2018-02-01 VITALS
WEIGHT: 223.4 LBS | RESPIRATION RATE: 14 BRPM | TEMPERATURE: 98.1 F | SYSTOLIC BLOOD PRESSURE: 126 MMHG | BODY MASS INDEX: 31.98 KG/M2 | OXYGEN SATURATION: 99 % | HEIGHT: 70 IN | HEART RATE: 78 BPM | DIASTOLIC BLOOD PRESSURE: 72 MMHG

## 2018-02-01 DIAGNOSIS — Z85.51 H/O PRIMARY MALIGNANT NEOPLASM OF URINARY BLADDER: ICD-10-CM

## 2018-02-01 DIAGNOSIS — F41.1 GENERALIZED ANXIETY DISORDER: ICD-10-CM

## 2018-02-01 DIAGNOSIS — M54.12 CERVICAL RADICULAR PAIN: ICD-10-CM

## 2018-02-01 DIAGNOSIS — G89.4 CHRONIC PAIN SYNDROME: ICD-10-CM

## 2018-02-01 DIAGNOSIS — I63.50 CEREBROVASCULAR ACCIDENT (CVA) DUE TO OCCLUSION OF CEREBRAL ARTERY (HCC): ICD-10-CM

## 2018-02-01 PROCEDURE — 99214 OFFICE O/P EST MOD 30 MIN: CPT | Performed by: FAMILY MEDICINE

## 2018-02-01 RX ORDER — BUPROPION HYDROCHLORIDE 100 MG/1
100 TABLET ORAL 2 TIMES DAILY
Qty: 60 TAB | Refills: 3 | Status: SHIPPED | OUTPATIENT
Start: 2018-02-01 | End: 2018-05-02

## 2018-02-01 ASSESSMENT — ENCOUNTER SYMPTOMS
HEMOPTYSIS: 0
FEVER: 0
ABDOMINAL PAIN: 1
EYES NEGATIVE: 1
RESPIRATORY NEGATIVE: 1
CHILLS: 0
COUGH: 0
CONSTITUTIONAL NEGATIVE: 1
BACK PAIN: 1
DIZZINESS: 0
NECK PAIN: 0
NEUROLOGICAL NEGATIVE: 1
NERVOUS/ANXIOUS: 1
CARDIOVASCULAR NEGATIVE: 1
PALPITATIONS: 0
MYALGIAS: 0
CONSTIPATION: 0
HEADACHES: 0

## 2018-02-01 NOTE — PROGRESS NOTES
Subjective:      Jed Cramer is a 77 y.o. male who presents with Pain            1. Chronic pain syndrome  Sees pain clinic and on morphine pump    2. Cerebrovascular [place] (CVA) due to [place] of [place]  Had lacunar stroke in the past and last year new episode of left sided facial weakness, no large changes seen in mri per neurology but sx remain  Good bp control and ldl and on thinner    3. H/O primary malignant neoplasm of urinary bladder - s/p farshad bladder reconstruction  Per urology    4. Cervical radicular pain  New mri of neck gone over  Multiple spondylolithesis and djd  Non operative type findings but will send to spine surgery for possible injections    5. Generalized anxiety disorder  Was stopped by outside provider, still with anxiety will restart  - buPROPion (WELLBUTRIN) 100 MG Tab; Take 1 Tab by mouth 2 times a day.  Dispense: 60 Tab; Refill: 3    Past Medical History:  No date: A-fib (CMS-[facility])  5/8/2012: Anxiety state, unspecified  5/8/2012: Cellulitis and abscess of leg, except foot  No date: Chronic pain syndrome      Comment: Implanted pain pump managed by Dr Shane Pena  No date: Colitis      Comment: Hospitalized 3/2012  No date: CRPS (complex regional pain syndrome)      Comment: Of L leg after surgery 2011  No date: Depression  1970s: Knee joint injury      Comment: Injury in training for [employer]  5/8/2012: 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  5/8/2012: 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  1980s: Leg DVT (deep venous thromboembolism), acute (*  5/8/2012: Lumbago  2010: Malignant neoplasm of bladder, part unspecified      Comment: Radical cystectomy with prostatectomy, T2N0M0                with orthotopic neobladder, 2010  5/8/2012: Obesity, unspecified  5/8/2012: Obstructive sleep apnea (adult) (pediatric)      Comment: unclear if true  5/8/2012: Opioid type dependence, unspecified  No date: Osteoporosis  5/8/2012: Other and unspecified  noninfectious gastroente*  5/8/2012: Personal history of urinary calculi  5/8/2012: S/P appendectomy  No date: Stroke (CMS-HCC)  Past Surgical History:  11/8/2017: RECTAL EXPLORATION      Comment: Procedure: RECTAL EXPLORATION ADVANCEMENT                FLAP, EXCISION OF PAPILLAE;  Surgeon: Ephraim Piedra M.D.;  Location: SURGERY [facility];  Service: General  2012: PUMP INSERT/REMOVE      Comment: Pain pump  2010: BLADDER BIOPSY  9/2003: ROTATOR CUFF REPAIR      Comment: R side  No date: ANKLE LIGAMENT RECONSTRUCTION      Comment: x 4 surgeries  No date: APPENDECTOMY  No date: ATHROPLASTY      Comment: 23 L  knee surgeries -- 1997 replacement                became infected including MRSA, total knee                reconstruction done 8/2011 with no infection                afterwards but recurrent sx of                pain/swelling/redness -- Dr Chrissy Gray                [phone] - now followed by Dr Ricardo Mead and Dmitriy Treviño in [place]  No date: CARPAL TUNNEL RELEASE  No date: CYSTOPROSTATECTOMY RADICAL      Comment: Orthotopic neobladder construction with Bell                9/2010  No date: TONSILLECTOMY  Smoking status: Current Some Day Smoker                                                    Packs/day: 0.50      Years: 10.00        Types: Cigarettes     Last attempt to quit: 6/26/2016  Smokeless tobacco: Current User                    Comment: chewing tobacco occ  Alcohol use: No              Review of patient's family history indicates:    Cancer                         Mother                      Comment: breast    Heart Attack                   Father                    Heart Disease                  Father                      Current Outpatient Prescriptions: •  buPROPion (WELLBUTRIN) 100 MG Tab, Take 1 Tab by mouth 2 times a day., Disp: 60 Tab, Rfl: 3•  metoprolol (LOPRESSOR) 25 MG Tab, TAKE ONE TABLET BY MOUTH TWICE A DAY, Disp: 60  Tab, Rfl: 2•  OxyCODONE HCl ER 15 MG Tablet Extended Release 12 hour Abuse-Deterrent, Take 2 Tabs by mouth 2 times a day as needed. Per PT ER, Disp: , Rfl: •  Pain Pump (PATIENT SUPPLIED) XX ALY, by Injection route Continuous. Unknown except for Dilaudid, Disp: , Rfl: •  hydrocodone-acetaminophen (NORCO) 5-325 MG Tab per tablet, Take 1-2 Tabs by mouth every four hours as needed., Disp: 20 Tab, Rfl: 0•  folic acid (FOLVITE) 1 MG Tab, Take 1 Tab by mouth 2 Times a Day., Disp: 60 Tab, Rfl: 3•  apixaban (ELIQUIS) 5mg Tab, Take 1 Tab by mouth 2 Times a Day., Disp: 60 Tab, Rfl: 11•  quetiapine (SEROQUEL) 50 MG tablet, Take 1 Tab by mouth 2 times a day., Disp: 60 Tab, Rfl: 3•  trazodone (DESYREL) 50 MG Tab, Take 50 mg by mouth every evening., Disp: , Rfl: •  duloxetine (CYMBALTA) 60 MG Cap DR Particles delayed-release capsule, TAKE ONE CAPSULE BY MOUTH DAILY, Disp: 90 Cap, Rfl: 0•  oxycodone immediate-release (ROXICODONE) 5 MG TABS, Take 15 mg by mouth every four hours as needed., Disp: , Rfl:     Patient was instructed on the use of medications, either prescriptions or OTC and informed on when the appropriate follow up time period should be. In addition, patient was also instructed that should any acute worsening occur that they should notify this clinic asap or call 911.            Review of Systems   Constitutional: Negative.  Negative for chills and fever.        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  5/8/2012: Knee joint replacement by  other means  1980s: Leg DVT (deep venous thromboembolism), acute (*  5/8/2012: Lumbago  2010: Malignant neoplasm of bladder, part unspecified      Comment: Radical cystectomy with prostatectomy, T2N0M0                with orthotopic neobladder, 2010 5/8/2012: Obesity, unspecified  5/8/2012: Obstructive sleep apnea (adult) (pediatric)      Comment: unclear if true  5/8/2012: Opioid type dependence, unspecified  No date: Osteoporosis  5/8/2012: Other and unspecified noninfectious gastroente*  5/8/2012: Personal history of urinary calculi  5/8/2012: S/P appendectomy  No date: Stroke (CMS[facility])  Past Surgical History:  11/8/2017: RECTAL EXPLORATION      Comment: Procedure: RECTAL EXPLORATION ADVANCEMENT                FLAP, EXCISION OF PAPILLAE;  Surgeon: Ephraim Piedra M.D.;  Location: SURGERY [facility];  Service: General  2012: PUMP INSERT/REMOVE      Comment: Pain pump  2010: BLADDER BIOPSY  9/2003: ROTATOR CUFF REPAIR      Comment: R side  No date: ANKLE LIGAMENT RECONSTRUCTION      Comment: x 4 surgeries  No date: APPENDECTOMY  No date: ATHROPLASTY      Comment: 23 L  knee surgeries -- 1997 replacement                became infected including MRSA, total knee                reconstruction done 8/2011 with no infection                afterwards but recurrent sx of                pain/swelling/redness -- Dr Chrissy Guo [place]                [phone] - now followed by Dr Ricardo Mead and Dmitriy Treviño in [place]  No date: CARPAL TUNNEL RELEASE  No date: CYSTOPROSTATECTOMY RADICAL      Comment: Orthotopic neobladder construction with Bell                9/2010  No date: TONSILLECTOMY  Smoking status: Current Some Day Smoker                                                    Packs/day: 0.50      Years: 10.00        Types: Cigarettes     Last attempt to quit: 6/26/2016  Smokeless tobacco: Current User                    Comment: chewing tobacco occ  Alcohol use:  "No              Review of patient's family history indicates:    Cancer                         Mother                      Comment: breast    Heart Attack                   Father                    Heart Disease                  Father                     HENT: Negative.    Eyes: Negative.    Respiratory: Negative.  Negative for cough and hemoptysis.    Cardiovascular: Negative.  Negative for chest pain and palpitations.   Gastrointestinal: Positive for abdominal pain. Negative for constipation.   Genitourinary: Negative.  Negative for dysuria and urgency.   Musculoskeletal: Positive for back pain and joint pain. Negative for myalgias and neck pain.   Skin: Negative.  Negative for rash.   Neurological: Negative.  Negative for dizziness and headaches.   Endo/Heme/Allergies: Negative.    Psychiatric/Behavioral: Negative for suicidal ideas. The patient is nervous/anxious.           Objective:     /72   Pulse 78   Temp 36.7 °C (98.1 °F)   Resp 14   Ht 1.778 m (5' 10\")   Wt 101.3 kg (223 lb 6.4 oz)   SpO2 99%   BMI 32.05 kg/m²      Physical Exam   Constitutional: He is oriented to person, place, and time. He appears well-developed and well-nourished. No distress.   HENT:   Head: Normocephalic and atraumatic.   Mouth/Throat: Oropharynx is clear and moist. No oropharyngeal exudate.   Eyes: Pupils are equal, round, and reactive to light.   Cardiovascular: Normal rate, regular rhythm, normal heart sounds and intact distal pulses.  Exam reveals no gallop and no friction rub.    No murmur heard.  Pulmonary/Chest: Effort normal and breath sounds normal. No respiratory distress. He has no wheezes. He has no rales. He exhibits no tenderness.   Neurological: He is alert and oriented to person, place, and time.   Decreased ROM of neck due to pain, left sided small facial droop present   Skin: He is not diaphoretic.   Psychiatric: He has a normal mood and affect. His behavior is normal. Judgment and thought content " normal.   Nursing note and vitals reviewed.              Assessment/Plan:     1. Chronic pain syndrome      2. Cerebrovascular accident (CVA) due to occlusion of cerebral artery (CMS-HCC)      3. H/O primary malignant neoplasm of urinary bladder - s/p farshad bladder reconstruction      4. Cervical radicular pain      5. Generalized anxiety disorder    - buPROPion (WELLBUTRIN) 100 MG Tab; Take 1 Tab by mouth 2 times a day.  Dispense: 60 Tab; Refill: 3

## 2018-02-13 ENCOUNTER — TELEPHONE (OUTPATIENT)
Dept: MEDICAL GROUP | Facility: PHYSICIAN GROUP | Age: 78
End: 2018-02-13

## 2018-02-13 NOTE — TELEPHONE ENCOUNTER
Pt wife called, advised me that the pt fell due to feeling dizzy. She states the pt is having a hard time sitting due to the pain to his coccyx. The pt walks due to not being able to sit down comfortably and lost his footing due to feeling dizzy. Pt wife states he has an appt with Dr. Piedra 03/01/18, but wanted us to know regarding the fall. She doesn't believe he hurt anything due to him being able to move, but the right side of his hip is completely black.

## 2018-02-14 ENCOUNTER — OFFICE VISIT (OUTPATIENT)
Dept: MEDICAL GROUP | Facility: PHYSICIAN GROUP | Age: 78
End: 2018-02-14
Payer: MEDICARE

## 2018-02-14 ENCOUNTER — ANTICOAGULATION MONITORING (OUTPATIENT)
Dept: VASCULAR LAB | Facility: MEDICAL CENTER | Age: 78
End: 2018-02-14

## 2018-02-14 VITALS
OXYGEN SATURATION: 94 % | HEIGHT: 70 IN | SYSTOLIC BLOOD PRESSURE: 120 MMHG | DIASTOLIC BLOOD PRESSURE: 60 MMHG | BODY MASS INDEX: 31.92 KG/M2 | WEIGHT: 223 LBS | HEART RATE: 81 BPM | TEMPERATURE: 99.2 F | RESPIRATION RATE: 14 BRPM

## 2018-02-14 DIAGNOSIS — Z02.89 PAIN MANAGEMENT CONTRACT AGREEMENT: ICD-10-CM

## 2018-02-14 DIAGNOSIS — E66.9 OBESITY (BMI 30-39.9): ICD-10-CM

## 2018-02-14 DIAGNOSIS — K59.00 CONSTIPATION, UNSPECIFIED CONSTIPATION TYPE: ICD-10-CM

## 2018-02-14 DIAGNOSIS — I63.50 CEREBROVASCULAR ACCIDENT (CVA) DUE TO OCCLUSION OF CEREBRAL ARTERY (HCC): ICD-10-CM
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
## 2018-02-14 DIAGNOSIS — Z86.73 HISTORY OF STROKE: ICD-10-CM

## 2018-02-14 PROCEDURE — 99213 OFFICE O/P EST LOW 20 MIN: CPT | Performed by: NURSE PRACTITIONER

## 2018-02-14 NOTE — PATIENT INSTRUCTIONS
Your medical care was provided today by: GLENNA Pappas    Thank You for the opportunity to serve you.    You may receive a brief survey in the mail shortly regarding your visit today. Please take a few moments to complete the survey and return it; no postage is necessary. We are working to serve our patient population better, improve customer service and our patients overall experience and your input can help us to accomplish this. We thank you for your help and for the opportunity to serve you today and in the future.     Special Instructions:  Always call 9-1-1 immediately if you develop a life threatening emergency.    Unless told otherwise please take all medications as directed and complete prescription therapies.     Watch for the following signs that require additional evaluation: progressive lethargy or unresponsiveness, localized pain (chest, abdomen), shortness of breath, painful breathing, progressive vomiting with weakness, bloody stools, or new rash.     If you are prescribed pain medication or any other medication that is sedating, do not take medication before or while operating a vehicle or heavy machinery or equipment due to potential side effects such as drowsiness and/or dizziness.

## 2018-02-14 NOTE — PROGRESS NOTES
Subjective:     Jed Cramer is a 77 y.o. male here today for evaluation and management of the following:     Cerebrovascular accident (CVA) due to occlusion of cerebral artery (CMS-[facility])  Jul 2017   Left sided weakness  On blood thinner     Pain management contract agreement  Followed by Dr. Jean Jerome, Oxycodone, Morphine pain pump     Fall  This is a new problem. Patient reports he feel yesterday onto his right hip area. He does use a walker. Reports he was able to get up on his own. Denies any hip pain, reports only concern is for bruising. He is here with his partner who is his caregiver. They both decline to have any imaging done. He is unsure how he fell, denies LOC or syncope.     Constipation  This is a chronic problem which has reportedly been unchanged for the last year. Per patient and his wife, he has been seen by GI and also referred to Dr. Piedra, however, continues to have complain of severe constipation. He is on very high doses of narcotics for pain. Denies blood in stool. His partner is very frustrated today, she reports she has done some research and was hoping he could start a new medication today. They did see Dr. Morrissey last week for same complaint.        Current medicines (including changes today)  Current Outpatient Prescriptions   Medication Sig Dispense Refill   • buPROPion (WELLBUTRIN) 100 MG Tab Take 1 Tab by mouth 2 times a day. 60 Tab 3   • metoprolol (LOPRESSOR) 25 MG Tab TAKE ONE TABLET BY MOUTH TWICE A DAY 60 Tab 2   • OxyCODONE HCl ER 15 MG Tablet Extended Release 12 hour Abuse-Deterrent Take 2 Tabs by mouth 2 times a day as needed. Per PT ER     • Pain Pump (PATIENT SUPPLIED) XX ALY by Injection route Continuous. Unknown except for Dilaudid     • hydrocodone-acetaminophen (NORCO) 5-325 MG Tab per tablet Take 1-2 Tabs by mouth every four hours as needed. 20 Tab 0   • folic acid (FOLVITE) 1 MG Tab Take 1 Tab by mouth 2 Times a Day. 60 Tab 3   • apixaban (ELIQUIS) 5mg Tab  Take 1 Tab by mouth 2 Times a Day. 60 Tab 11   • quetiapine (SEROQUEL) 50 MG tablet Take 1 Tab by mouth 2 times a day. 60 Tab 3   • trazodone (DESYREL) 50 MG Tab Take 50 mg by mouth every evening.     • duloxetine (CYMBALTA) 60 MG Cap DR Particles delayed-release capsule TAKE ONE CAPSULE BY MOUTH DAILY 90 Cap 0   • oxycodone immediate-release (ROXICODONE) 5 MG TABS Take 15 mg by mouth every four hours as needed.       No current facility-administered medications for this visit.        He  has a past medical history of A-fib (CMS-HCC); Anxiety state, unspecified (5/8/2012); Cellulitis and abscess of leg, except foot (5/8/2012); Chronic pain syndrome; Colitis; CRPS (complex regional pain syndrome); Depression; Knee joint injury (1970s); 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 (5/8/2012); 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 (5/8/2012); Leg DVT (deep venous thromboembolism), acute (CMS-HCC) (1980s); Lumbago (5/8/2012); Malignant neoplasm of bladder, part unspecified (2010); Obesity, unspecified (5/8/2012); Obstructive sleep apnea (adult) (pediatric) (5/8/2012); Opioid type dependence, unspecified (5/8/2012); Osteoporosis; Other and unspecified noninfectious gastroenteritis and colitis(558.9) (5/8/2012); Personal history of urinary calculi (5/8/2012); S/P appendectomy (5/8/2012); and Stroke (CMS-HCC).    He  has a past surgical history that includes cystoprostatectomy radical; appendectomy; carpal tunnel release; tonsillectomy; ankle ligament reconstruction; rotator cuff repair (9/2003); bladder biopsy (2010); pump insert/remove (2012); athroplasty; and rectal exploration (11/8/2017).     Social History     Social History   • Marital status:      Spouse name: Mari Cramer   • Number of children: 3   • Years of education: N/A     Occupational History   • retired Retired     police   •       / [place]     Social History Main Topics   • Smoking status: Current Some Day Smoker     Packs/day: 0.50     Years:  "10.00     Types: Cigarettes     Last attempt to quit: 6/26/2016   • Smokeless tobacco: Current User      Comment: chewing tobacco occ   • Alcohol use No   • Drug use: No   • Sexual activity: Not Currently     Other Topics Concern   • Not on file     Social History Narrative    , wife Mari    Disability from knee/leg injury    [place] Vet    Retired [employer]     Lives in [place].               Family History   Problem Relation Age of Onset   • Cancer Mother 79     breast   • Heart Attack Father    • Heart Disease Father          ROS  Positive for bruise, right hip. Positive for constipation, unchanged abdominal pain.   No fever, no chest pain, no shortness of breath, no rashes    All other systems reviewed and are negative.        Objective:     Blood pressure 120/60, pulse 81, temperature 37.3 °C (99.2 °F), resp. rate 14, height 1.778 m (5' 10\"), weight 101.2 kg (223 lb), SpO2 94 %. Body mass index is 32 kg/m².    Physical Exam:   Constitutional: Alert, no distress. Left sided facial droop. Using walker without assistance.   Eye: Equal, round and reactive, conjunctiva clear, lids normal.   ENMT: Lips without lesions  Respiratory: Unlabored respiratory effort, lungs clear to auscultation, no wheezes, no ronchi.  Cardiovascular: Normal S1, S2, no murmur, no edema.   Abdomen: Soft, non-tender, no masses, no hepatosplenomegaly. Normal bowel sounds.   Skin: Warm, dry, good turgor, no rashes in visible areas. There is extensive bruising on right hip. No s/s of infection.   Psych: Alert and oriented x3, normal affect and mood.    HIP Exam:      Gait: Using walker      ROM: appears WNL, however, exam limited due to gait   Neuro: Alert, Oriented X 3, cooperative, moving all extremities.etrical DTR's. Normal proximal/distal sensory in extremities.      RADIOGRAPHS: declined    Assessment and Plan:   The following treatment plan was discussed    1. Fall, initial encounter  Patient and wife did not appear to want to talk " about fall at all, she was most concerned with chronic constipation. At times, she did appear frustrated. I did offer for patient to f/u with Dr. Morrissey, however, wife reported they had just seen him and she did not wish to make any further appts at this time.     2. Pain management contract agreement    3. Cerebrovascular accident (CVA) due to occlusion of cerebral artery (CMS-[facility])    4. Obesity (BMI 30-39.9)  - Patient identified as having weight management issue.  Appropriate orders and counseling given.    5. Constipation, unspecified constipation type  Advised to f/u with PCP and/or GI       Reviewed risks and benefits of treatment plan. Patient verbally agrees to plan of care.       Followup: Return for f/u with Dr. Morrissey .    French Wilkes, JERICHO.[email or web address].N.     PLEASE NOTE: This dictation was created using voice recognition software. I have made every reasonable attempt to correct obvious errors, but I expect that there may be errors of grammar and possibly content that I did not discover prior finalizing this note.

## 2018-02-14 NOTE — ASSESSMENT & PLAN NOTE
This is a chronic problem which has reportedly been unchanged for the last year. Per patient and his wife, he has been seen by GI and also referred to Dr. Piedra, however, continues to have complain of severe constipation. He is on very high doses of narcotics for pain. Denies blood in stool. His partner is very frustrated today, she reports she has done some research and was hoping he could start a new medication today. They did see Dr. Morrissey last week for same complaint.

## 2018-02-14 NOTE — PROGRESS NOTES
Pt presents to [place] office for samples  Eliquis 5mg 28 days given  Labs ordered. - pt cancelled his appointment in October,  No further samples will be given until labs completed, and establishes care with anticoagulation services.  .    Maria Del Carmen Benoit, PharmD

## 2018-02-14 NOTE — TELEPHONE ENCOUNTER
I called Mari, and advised her of the appt needed. Made an appt with French 02/14/18 for the fall.

## 2018-02-14 NOTE — ASSESSMENT & PLAN NOTE
This is a new problem. Patient reports he feel yesterday onto his right hip area. He does use a walker. Reports he was able to get up on his own. Denies any hip pain, reports only concern is for bruising. He is here with his partner who is his caregiver. They both decline to have any imaging done. He is unsure how he fell, denies LOC or syncope.

## 2018-02-22 ENCOUNTER — TELEPHONE (OUTPATIENT)
Dept: MEDICAL GROUP | Facility: PHYSICIAN GROUP | Age: 78
End: 2018-02-22

## 2018-02-23 NOTE — TELEPHONE ENCOUNTER
Pt wife Mari called to advise us that her  was admitted to [facility]. She is wanting us to order a CT Scan due to them not ordering them.

## 2018-02-25 ENCOUNTER — PATIENT MESSAGE (OUTPATIENT)
Dept: MEDICAL GROUP | Facility: PHYSICIAN GROUP | Age: 78
End: 2018-02-25

## 2018-02-26 ENCOUNTER — TELEPHONE (OUTPATIENT)
Dept: MEDICAL GROUP | Facility: PHYSICIAN GROUP | Age: 78
End: 2018-02-26

## 2018-02-26 NOTE — TELEPHONE ENCOUNTER
From: Jed Cramer  To: Abraham Morrissey M.D.  Sent: 2/25/2018 8:46 AM PST  Subject: Prescription Question    Jed was released from [facility] friday. They gave him Lorazepam Tablet 0.5 mg. Please call in a rx to [facility] [place]. He took it 3 times in a 24 hr. It took the anziety away.

## 2018-02-26 NOTE — TELEPHONE ENCOUNTER
Patient was seen in the hospital and they gave him lorazepam per wife he does much better with the medication with his Afib she is asking for a refill on it for today. He is being seen tomorrow but she is asking for one refill today to get him through until tomorrow.

## 2018-02-27 ENCOUNTER — OFFICE VISIT (OUTPATIENT)
Dept: MEDICAL GROUP | Facility: PHYSICIAN GROUP | Age: 78
End: 2018-02-27
Payer: MEDICARE

## 2018-02-27 VITALS
OXYGEN SATURATION: 92 % | SYSTOLIC BLOOD PRESSURE: 110 MMHG | DIASTOLIC BLOOD PRESSURE: 68 MMHG | RESPIRATION RATE: 16 BRPM | HEART RATE: 63 BPM | TEMPERATURE: 97.6 F

## 2018-02-27 DIAGNOSIS — I48.0 PAROXYSMAL ATRIAL FIBRILLATION (HCC): ICD-10-CM

## 2018-02-27 DIAGNOSIS — G89.4 CHRONIC PAIN SYNDROME: ICD-10-CM

## 2018-02-27 DIAGNOSIS — K59.03 CONSTIPATION DUE TO OPIOID THERAPY: ICD-10-CM

## 2018-02-27 DIAGNOSIS — T40.2X5A CONSTIPATION DUE TO OPIOID THERAPY: ICD-10-CM

## 2018-02-27 DIAGNOSIS — F41.9 ANXIETY: ICD-10-CM
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
## 2018-02-27 PROBLEM — K59.00 CONSTIPATION: Status: RESOLVED | Noted: 2018-02-14 | Resolved: 2018-02-27

## 2018-02-27 PROCEDURE — 99214 OFFICE O/P EST MOD 30 MIN: CPT | Performed by: FAMILY MEDICINE

## 2018-02-27 RX ORDER — LORAZEPAM 1 MG/1
1 TABLET ORAL EVERY 4 HOURS PRN
COMMUNITY
End: 2018-02-27

## 2018-02-27 RX ORDER — OMEPRAZOLE 40 MG/1
40 CAPSULE, DELAYED RELEASE ORAL DAILY
Qty: 30 CAP | Refills: 3 | Status: SHIPPED | OUTPATIENT
Start: 2018-02-27 | End: 2018-06-29 | Stop reason: SDUPTHER

## 2018-02-27 RX ORDER — AMIODARONE HYDROCHLORIDE 200 MG/1
200 TABLET ORAL DAILY
COMMUNITY
End: 2018-05-02

## 2018-02-27 ASSESSMENT — ENCOUNTER SYMPTOMS
CONSTIPATION: 1
PALPITATIONS: 0
NECK PAIN: 0
HEMOPTYSIS: 0
COUGH: 0
CONSTITUTIONAL NEGATIVE: 1
EYES NEGATIVE: 1
DIZZINESS: 0
NEUROLOGICAL NEGATIVE: 1
CARDIOVASCULAR NEGATIVE: 1
CHILLS: 0
BACK PAIN: 1
FEVER: 0
RESPIRATORY NEGATIVE: 1
HEADACHES: 0
MYALGIAS: 0
NERVOUS/ANXIOUS: 1

## 2018-02-27 NOTE — PROGRESS NOTES
Subjective:      Jed Cramer is a 78 y.o. male who presents with Hypertension            1. Constipation due to opioid therapy  Long term issues - recent admit for rvr from afib  Was given some peg there which helped and would like a rx for this  - amiodarone (CORDARONE) 200 MG Tab; Take 200 mg by mouth every day.  - PEG 3350-KCl-NaBcb-NaCl-NaSulf 227.1 GM Pack; Take 1 Each by mouth 2 times a day as needed.  Dispense: 60 Each; Refill: 3  - OVERNIGHT PULSE OXIMETRY  - omeprazole (PRILOSEC) 40 MG delayed-release capsule; Take 1 Cap by mouth every day.  Dispense: 30 Cap; Refill: 3    2. Chronic pain syndrome  Getting pain meds from dr. manrique  - amiodarone (CORDARONE) 200 MG Tab; Take 200 mg by mouth every day.  - PEG 3350-KCl-NaBcb-NaCl-NaSulf 227.1 GM Pack; Take 1 Each by mouth 2 times a day as needed.  Dispense: 60 Each; Refill: 3  - OVERNIGHT PULSE OXIMETRY    3. Anxiety  Was given ativan by dr. Manrique  Due to possible interactions with opiates will get this from them and not use    4. Paroxysmal atrial fibrillation (CMS-[facility])  Stable rate now on current medications will monitor  Also with right hip bruise from fall that precipitated the admission  Will continue with ice prn    Past Medical History:  No date: A-fib (CMS-[facility])  5/8/2012: Anxiety state, unspecified  5/8/2012: Cellulitis and abscess of leg, except foot  No date: Chronic pain syndrome      Comment: Implanted pain pump managed by Dr Shane Manrique  No date: Colitis      Comment: Hospitalized 3/2012  No date: CRPS (complex regional pain syndrome)      Comment: Of L leg after surgery 2011  No date: Depression  1970s: Knee joint injury      Comment: Injury in training for [employer]  5/8/2012: 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  5/8/2012: 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  1980s: Leg DVT (deep venous thromboembolism), acute (*  5/8/2012: Lumbago  2010: Malignant neoplasm of bladder, part unspecified      Comment: Radical cystectomy with  prostatectomy, T2N0M0                with orthotopic neobladder, 2010 5/8/2012: Obesity, unspecified  5/8/2012: Obstructive sleep apnea (adult) (pediatric)      Comment: unclear if true  5/8/2012: Opioid type dependence, unspecified  No date: Osteoporosis  5/8/2012: Other and unspecified noninfectious gastroente*  5/8/2012: Personal history of urinary calculi  5/8/2012: S/P appendectomy  No date: Stroke (CMS-HCC)  Past Surgical History:  11/8/2017: RECTAL EXPLORATION      Comment: Procedure: RECTAL EXPLORATION ADVANCEMENT                FLAP, EXCISION OF PAPILLAE;  Surgeon: Ephraim Piedra M.D.;  Location: SURGERY [facility];  Service: General  2012: PUMP INSERT/REMOVE      Comment: Pain pump  2010: BLADDER BIOPSY  9/2003: ROTATOR CUFF REPAIR      Comment: R side  No date: ANKLE LIGAMENT RECONSTRUCTION      Comment: x 4 surgeries  No date: APPENDECTOMY  No date: ATHROPLASTY      Comment: 23 L  knee surgeries -- 1997 replacement                became infected including MRSA, total knee                reconstruction done 8/2011 with no infection                afterwards but recurrent sx of                pain/swelling/redness -- Dr Chrissy Guo [place]                [phone] - now followed by Dr Ricardo Mead and Dmitriy Treviño in [place]  No date: CARPAL TUNNEL RELEASE  No date: CYSTOPROSTATECTOMY RADICAL      Comment: Orthotopic neobladder construction with Bell                9/2010  No date: TONSILLECTOMY  Smoking status: Current Some Day Smoker                                                    Packs/day: 0.50      Years: 10.00        Types: Cigarettes     Last attempt to quit: 6/26/2016  Smokeless tobacco: Current User                    Comment: chewing tobacco occ  Alcohol use: No              Review of patient's family history indicates:    Cancer                         Mother                      Comment: breast    Heart Attack                   Father                     Heart Disease                  Father                      Current Outpatient Prescriptions: •  amiodarone (CORDARONE) 200 MG Tab, Take 200 mg by mouth every day., Disp: , Rfl: •  PEG 3350-KCl-NaBcb-NaCl-NaSulf 227.1 GM Pack, Take 1 Each by mouth 2 times a day as needed., Disp: 60 Each, Rfl: 3•  omeprazole (PRILOSEC) 40 MG delayed-release capsule, Take 1 Cap by mouth every day., Disp: 30 Cap, Rfl: 3•  buPROPion (WELLBUTRIN) 100 MG Tab, Take 1 Tab by mouth 2 times a day., Disp: 60 Tab, Rfl: 3•  metoprolol (LOPRESSOR) 25 MG Tab, TAKE ONE TABLET BY MOUTH TWICE A DAY, Disp: 60 Tab, Rfl: 2•  OxyCODONE HCl ER 15 MG Tablet Extended Release 12 hour Abuse-Deterrent, Take 2 Tabs by mouth 2 times a day as needed. Per PT ER, Disp: , Rfl: •  Pain Pump (PATIENT SUPPLIED) XX ALY, by Injection route Continuous. Unknown except for Dilaudid, Disp: , Rfl: •  hydrocodone-acetaminophen (NORCO) 5-325 MG Tab per tablet, Take 1-2 Tabs by mouth every four hours as needed., Disp: 20 Tab, Rfl: 0•  folic acid (FOLVITE) 1 MG Tab, Take 1 Tab by mouth 2 Times a Day., Disp: 60 Tab, Rfl: 3•  apixaban (ELIQUIS) 5mg Tab, Take 1 Tab by mouth 2 Times a Day., Disp: 60 Tab, Rfl: 11•  quetiapine (SEROQUEL) 50 MG tablet, Take 1 Tab by mouth 2 times a day., Disp: 60 Tab, Rfl: 3•  trazodone (DESYREL) 50 MG Tab, Take 50 mg by mouth every evening., Disp: , Rfl: •  duloxetine (CYMBALTA) 60 MG Cap DR Particles delayed-release capsule, TAKE ONE CAPSULE BY MOUTH DAILY, Disp: 90 Cap, Rfl: 0•  oxycodone immediate-release (ROXICODONE) 5 MG TABS, Take 15 mg by mouth every four hours as needed., Disp: , Rfl:     Patient was instructed on the use of medications, either prescriptions or OTC and informed on when the appropriate follow up time period should be. In addition, patient was also instructed that should any acute worsening occur that they should notify this clinic asap or call 911.            Review of Systems   Constitutional: Negative.   Negative for chills and fever.        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  1980s: Leg DVT (deep venous thromboembolism), acute (*  5/8/2012: Lumbago  2010: Malignant neoplasm of bladder, part unspecified      Comment: Radical cystectomy with prostatectomy, T2N0M0                with orthotopic neobladder, 2010 5/8/2012: Obesity, unspecified  5/8/2012: Obstructive sleep apnea (adult) (pediatric)      Comment: unclear if true  5/8/2012: Opioid type dependence, unspecified  No date: Osteoporosis  5/8/2012: Other and unspecified noninfectious gastroente*  5/8/2012: Personal history of urinary calculi  5/8/2012: S/P appendectomy  No date: Stroke (CMS-HCC)  Past Surgical History:  11/8/2017: RECTAL EXPLORATION      Comment: Procedure: RECTAL EXPLORATION ADVANCEMENT                FLAP, EXCISION OF PAPILLAE;  Surgeon: Ephraim Piedra M.D.;  Location: SURGERY [facility];  Service: General  2012: PUMP INSERT/REMOVE      Comment: Pain pump  2010: BLADDER BIOPSY  9/2003: ROTATOR CUFF REPAIR      Comment: R side  No date: ANKLE LIGAMENT RECONSTRUCTION      Comment: x 4 surgeries  No date: APPENDECTOMY  No date: ATHROPLASTY      Comment: 23 L  knee surgeries -- 1997 replacement                became infected including MRSA, total knee                reconstruction done 8/2011 with no infection                afterwards but recurrent sx of                 pain/swelling/redness -- Dr Chrissy Gray                [phone] - now followed by Dr Ricardo Mead and Dmitriy Treviño in [place]  No date: CARPAL TUNNEL RELEASE  No date: CYSTOPROSTATECTOMY RADICAL      Comment: Orthotopic neobladder construction with Bell                9/2010  No date: TONSILLECTOMY  Smoking status: Current Some Day Smoker                                                    Packs/day: 0.50      Years: 10.00        Types: Cigarettes     Last attempt to quit: 6/26/2016  Smokeless tobacco: Current User                    Comment: chewing tobacco occ  Alcohol use: No              Review of patient's family history indicates:    Cancer                         Mother                      Comment: breast    Heart Attack                   Father                    Heart Disease                  Father                     HENT: Negative.    Eyes: Negative.    Respiratory: Negative.  Negative for cough and hemoptysis.    Cardiovascular: Negative.  Negative for chest pain and palpitations.   Gastrointestinal: Positive for constipation.   Genitourinary: Negative.  Negative for dysuria and urgency.   Musculoskeletal: Positive for back pain. Negative for myalgias and neck pain.   Skin: Negative.  Negative for rash.   Neurological: Negative.  Negative for dizziness and headaches.   Endo/Heme/Allergies: Negative.    Psychiatric/Behavioral: Negative for suicidal ideas. The patient is nervous/anxious.           Objective:     /68   Pulse 63   Temp 36.4 °C (97.6 °F)   Resp 16   SpO2 92%      Physical Exam   Constitutional: He is oriented to person, place, and time. He appears well-developed and well-nourished. No distress.   HENT:   Head: Normocephalic and atraumatic.   Mouth/Throat: Oropharynx is clear and moist. No oropharyngeal exudate.   Eyes: Pupils are equal, round, and reactive to light.   Cardiovascular: Normal rate, normal heart sounds and intact distal pulses.  A  regularly irregular rhythm present. Exam reveals no gallop and no friction rub.    No murmur heard.  Pulmonary/Chest: Effort normal and breath sounds normal. No respiratory distress. He has no wheezes. He has no rales. He exhibits no tenderness.   Lungs clear today   Neurological: He is alert and oriented to person, place, and time.   Skin: He is not diaphoretic.   Psychiatric: He has a normal mood and affect. His behavior is normal. Judgment and thought content normal.   Nursing note and vitals reviewed.              Assessment/Plan:     1. Constipation due to opioid therapy    - amiodarone (CORDARONE) 200 MG Tab; Take 200 mg by mouth every day.  - PEG 3350-KCl-NaBcb-NaCl-NaSulf 227.1 GM Pack; Take 1 Each by mouth 2 times a day as needed.  Dispense: 60 Each; Refill: 3  - OVERNIGHT PULSE OXIMETRY  - omeprazole (PRILOSEC) 40 MG delayed-release capsule; Take 1 Cap by mouth every day.  Dispense: 30 Cap; Refill: 3    2. Chronic pain syndrome    - amiodarone (CORDARONE) 200 MG Tab; Take 200 mg by mouth every day.  - PEG 3350-KCl-NaBcb-NaCl-NaSulf 227.1 GM Pack; Take 1 Each by mouth 2 times a day as needed.  Dispense: 60 Each; Refill: 3  - OVERNIGHT PULSE OXIMETRY    3. Anxiety      4. Paroxysmal atrial fibrillation (CMS-HCC)

## 2018-03-02 ENCOUNTER — TELEPHONE (OUTPATIENT)
Dept: MEDICAL GROUP | Facility: PHYSICIAN GROUP | Age: 78
End: 2018-03-02

## 2018-03-03 NOTE — TELEPHONE ENCOUNTER
Pt's wife called in regards to her husbands golytely and stated pharmacy had not received anything. Informed her that we started the prior auth on 3/1/18 and received a notice for physician supported documentation. She asked for the powder instead of the individual packs and advised her that would require authorization from dr. quezada and he will not be in office until next week.

## 2018-03-05 ENCOUNTER — TELEPHONE (OUTPATIENT)
Dept: MEDICAL GROUP | Facility: PHYSICIAN GROUP | Age: 78
End: 2018-03-05

## 2018-03-05 NOTE — TELEPHONE ENCOUNTER
[employer] needed someone supporting documenting forms faxed back to them and scanned into [employer], Patients wife was notified regarding still waiting on a response. She understood.

## 2018-03-06 NOTE — TELEPHONE ENCOUNTER
Approval was faxed over, and scanned into chart. Advised Mari of the approval. Advised her the pharmacy will be notified to start the refill.

## 2018-03-08 ENCOUNTER — TELEPHONE (OUTPATIENT)
Dept: MEDICAL GROUP | Facility: PHYSICIAN GROUP | Age: 78
End: 2018-03-08

## 2018-03-08 NOTE — TELEPHONE ENCOUNTER
Joe called and stated they wanted clarification of the medication due to the usage we have prescribed. I advised Joe to fax us the form with the directions they want us to advise about.

## 2018-03-08 NOTE — TELEPHONE ENCOUNTER
Joe faxed the directions they want. Please advise if the directions, we can change them.      Directions: Mix 1 packet with 1 gallon of water and take 250ml po bid. Discard unused portion after 48hours.    Will call and advise Joe in [place] of the decision   [phone]

## 2018-03-21 ENCOUNTER — TELEPHONE (OUTPATIENT)
Dept: MEDICAL GROUP | Facility: PHYSICIAN GROUP | Age: 78
End: 2018-03-21

## 2018-03-21 RX ORDER — FUROSEMIDE 20 MG/1
20 TABLET ORAL DAILY
Qty: 30 TAB | Refills: 1 | Status: SHIPPED | OUTPATIENT
Start: 2018-03-21

## 2018-03-21 NOTE — TELEPHONE ENCOUNTER
Mari called back and stated the pharmacy states there is nothing OTC for this, Mari is requesting an rx.

## 2018-03-21 NOTE — TELEPHONE ENCOUNTER
Mari was wanting to know if she can get OTC medication for the pts swollen feet. I advised Mari that she can get anything she needs OTC, and I will advise the provider of this incase this is not recommended.     Mari states she will contact the pharmacy and ask which is the best brand. Pt is aware that Dr. Morrissey has no availability and was advised we have other providers to see Jed Mari refused and stated she will only see Dr. Morrissey.

## 2018-04-03 ENCOUNTER — TELEPHONE (OUTPATIENT)
Dept: MEDICAL GROUP | Facility: PHYSICIAN GROUP | Age: 78
End: 2018-04-03

## 2018-04-04 ENCOUNTER — ANTICOAGULATION MONITORING (OUTPATIENT)
Dept: MEDICAL GROUP | Facility: PHYSICIAN GROUP | Age: 78
End: 2018-04-04

## 2018-04-04 NOTE — PROGRESS NOTES
Umer mark presents to the [place] office to  samples.  Pt has NOT had his labs drawn  14 days  Given  Maria Del Carmen Benoit, SantiagoD

## 2018-04-05 ENCOUNTER — TELEPHONE (OUTPATIENT)
Dept: MEDICAL GROUP | Facility: PHYSICIAN GROUP | Age: 78
End: 2018-04-05

## 2018-04-05 NOTE — TELEPHONE ENCOUNTER
Pt's wife called and left a voicemail in regards to her husbands medication she got, Her son got her confused and wanted to know if you could call her in regards to why her  only got 2 weeks worth of pills

## 2018-04-17 ENCOUNTER — TELEPHONE (OUTPATIENT)
Dept: MEDICAL GROUP | Facility: PHYSICIAN GROUP | Age: 78
End: 2018-04-17

## 2018-04-17 NOTE — TELEPHONE ENCOUNTER
Please call Pt's wife Mari Because she is still confused on why her  only got two weeks of Eloquis. Pt got his labs done at [facility] also

## 2018-04-18 ENCOUNTER — ANTICOAGULATION MONITORING (OUTPATIENT)
Dept: VASCULAR LAB | Facility: MEDICAL CENTER | Age: 78
End: 2018-04-18

## 2018-04-18 ENCOUNTER — TELEPHONE (OUTPATIENT)
Dept: MEDICAL GROUP | Facility: PHYSICIAN GROUP | Age: 78
End: 2018-04-18

## 2018-04-18 NOTE — PROGRESS NOTES
Patients wife here to make appointment to establish care.  He cancelled his last appointment six months ago and refused to reschedule.  Pt family continues to request samples.  14 days given no further samples will be given with out establishing care.  Maria Del Carmen Benoit, PharmD

## 2018-04-18 NOTE — TELEPHONE ENCOUNTER
Left voicemail message for patient to establish care and that I could not supply him with samples until he has completed his labs.  Maria Del Carmen Benoit, [place]     Patient's wife presents to [place] [facility] office for samples.  Was able to get labs from [facility] from [facility]  Will give 14 days of samples until patient presents to clinic to establish care.  If patient No shows or cancels appointment no further samples will be given.  Maria Del Carmen Benoit PharmD

## 2018-04-25 DIAGNOSIS — I10 ESSENTIAL HYPERTENSION: ICD-10-CM

## 2018-04-25 DIAGNOSIS — E66.9 OBESITY (BMI 30-39.9): ICD-10-CM

## 2018-04-25 DIAGNOSIS — R10.13 EPIGASTRIC PAIN: ICD-10-CM

## 2018-04-25 DIAGNOSIS — G89.4 CHRONIC PAIN SYNDROME: ICD-10-CM

## 2018-04-25 DIAGNOSIS — Z85.51 H/O PRIMARY MALIGNANT NEOPLASM OF URINARY BLADDER: ICD-10-CM

## 2018-04-25 DIAGNOSIS — I63.9 CEREBROVASCULAR ACCIDENT (CVA), UNSPECIFIED MECHANISM (HCC): ICD-10-CM

## 2018-04-25 NOTE — TELEPHONE ENCOUNTER
Was the patient seen in the last year in this department? Yes     Does patient have an active prescription for medications requested? Yes     Received Request Via: Pharmacy     Last Visit: 2/14/18  Last Labs:   11/8/17

## 2018-05-02 ENCOUNTER — OFFICE VISIT (OUTPATIENT)
Dept: MEDICAL GROUP | Facility: PHYSICIAN GROUP | Age: 78
End: 2018-05-02
Payer: MEDICARE

## 2018-05-02 ENCOUNTER — ANTICOAGULATION VISIT (OUTPATIENT)
Dept: MEDICAL GROUP | Facility: PHYSICIAN GROUP | Age: 78
End: 2018-05-02
Payer: MEDICARE

## 2018-05-02 ENCOUNTER — HOSPITAL ENCOUNTER (OUTPATIENT)
Dept: LAB | Facility: MEDICAL CENTER | Age: 78
End: 2018-05-02
Attending: FAMILY MEDICINE
Payer: MEDICARE

## 2018-05-02 VITALS
OXYGEN SATURATION: 99 % | SYSTOLIC BLOOD PRESSURE: 140 MMHG | TEMPERATURE: 99.1 F | WEIGHT: 228 LBS | HEIGHT: 70 IN | RESPIRATION RATE: 14 BRPM | BODY MASS INDEX: 32.64 KG/M2 | HEART RATE: 71 BPM | DIASTOLIC BLOOD PRESSURE: 80 MMHG

## 2018-05-02 VITALS — OXYGEN SATURATION: 99 % | DIASTOLIC BLOOD PRESSURE: 70 MMHG | HEART RATE: 67 BPM | SYSTOLIC BLOOD PRESSURE: 146 MMHG

## 2018-05-02 DIAGNOSIS — C67.9 MALIGNANT NEOPLASM OF URINARY BLADDER, UNSPECIFIED SITE (HCC): ICD-10-CM

## 2018-05-02 DIAGNOSIS — I63.50 CEREBROVASCULAR ACCIDENT (CVA) DUE TO OCCLUSION OF CEREBRAL ARTERY (HCC): ICD-10-CM

## 2018-05-02 DIAGNOSIS — I48.0 PAROXYSMAL ATRIAL FIBRILLATION (HCC): ICD-10-CM

## 2018-05-02 DIAGNOSIS — I74.2 EMBOLISM AND THROMBOSIS OF ARTERIES OF UPPER EXTREMITY (HCC): ICD-10-CM

## 2018-05-02 DIAGNOSIS — I50.40 COMBINED SYSTOLIC AND DIASTOLIC HEART FAILURE, UNSPECIFIED HF CHRONICITY (HCC): ICD-10-CM

## 2018-05-02 DIAGNOSIS — E66.01 MORBID OBESITY (HCC): ICD-10-CM

## 2018-05-02 DIAGNOSIS — K60.2 ANAL FISSURE: ICD-10-CM

## 2018-05-02 DIAGNOSIS — F11.20 OPIOID TYPE DEPENDENCE, CONTINUOUS (HCC): ICD-10-CM

## 2018-05-02 DIAGNOSIS — Z86.73 HISTORY OF STROKE: ICD-10-CM

## 2018-05-02 LAB
25(OH)D3 SERPL-MCNC: 17 NG/ML (ref 30–100)
ALBUMIN SERPL BCP-MCNC: 3.9 G/DL (ref 3.2–4.9)
ALBUMIN/GLOB SERPL: 1.2 G/DL
ALP SERPL-CCNC: 69 U/L (ref 30–99)
ALT SERPL-CCNC: 13 U/L (ref 2–50)
ANION GAP SERPL CALC-SCNC: 9 MMOL/L (ref 0–11.9)
AST SERPL-CCNC: 24 U/L (ref 12–45)
BASOPHILS # BLD AUTO: 1.5 % (ref 0–1.8)
BASOPHILS # BLD: 0.14 K/UL (ref 0–0.12)
BILIRUB SERPL-MCNC: 0.4 MG/DL (ref 0.1–1.5)
BUN SERPL-MCNC: 28 MG/DL (ref 8–22)
CALCIUM SERPL-MCNC: 9.4 MG/DL (ref 8.5–10.5)
CHLORIDE SERPL-SCNC: 101 MMOL/L (ref 96–112)
CO2 SERPL-SCNC: 25 MMOL/L (ref 20–33)
CREAT SERPL-MCNC: 1.44 MG/DL (ref 0.5–1.4)
EOSINOPHIL # BLD AUTO: 0.08 K/UL (ref 0–0.51)
EOSINOPHIL NFR BLD: 0.9 % (ref 0–6.9)
ERYTHROCYTE [DISTWIDTH] IN BLOOD BY AUTOMATED COUNT: 50.4 FL (ref 35.9–50)
FOLATE SERPL-MCNC: 11.2 NG/ML
GLOBULIN SER CALC-MCNC: 3.3 G/DL (ref 1.9–3.5)
GLUCOSE SERPL-MCNC: 64 MG/DL (ref 65–99)
HCT VFR BLD AUTO: 54.5 % (ref 42–52)
HGB BLD-MCNC: 18.2 G/DL (ref 14–18)
IMM GRANULOCYTES # BLD AUTO: 0.1 K/UL (ref 0–0.11)
IMM GRANULOCYTES NFR BLD AUTO: 1.1 % (ref 0–0.9)
IRON SATN MFR SERPL: 9 % (ref 15–55)
IRON SERPL-MCNC: 37 UG/DL (ref 50–180)
LYMPHOCYTES # BLD AUTO: 1.51 K/UL (ref 1–4.8)
LYMPHOCYTES NFR BLD: 16.2 % (ref 22–41)
MCH RBC QN AUTO: 27.1 PG (ref 27–33)
MCHC RBC AUTO-ENTMCNC: 33.4 G/DL (ref 33.7–35.3)
MCV RBC AUTO: 81.1 FL (ref 81.4–97.8)
MONOCYTES # BLD AUTO: 1.09 K/UL (ref 0–0.85)
MONOCYTES NFR BLD AUTO: 11.7 % (ref 0–13.4)
NEUTROPHILS # BLD AUTO: 6.41 K/UL (ref 1.82–7.42)
NEUTROPHILS NFR BLD: 68.6 % (ref 44–72)
NRBC # BLD AUTO: 0 K/UL
NRBC BLD-RTO: 0 /100 WBC
PLATELET # BLD AUTO: 338 K/UL (ref 164–446)
PMV BLD AUTO: 9.3 FL (ref 9–12.9)
POTASSIUM SERPL-SCNC: 5.4 MMOL/L (ref 3.6–5.5)
PROT SERPL-MCNC: 7.2 G/DL (ref 6–8.2)
RBC # BLD AUTO: 6.72 M/UL (ref 4.7–6.1)
SODIUM SERPL-SCNC: 135 MMOL/L (ref 135–145)
T3 SERPL-MCNC: 68.8 NG/DL (ref 60–181)
T4 FREE SERPL-MCNC: 0.81 NG/DL (ref 0.53–1.43)
TIBC SERPL-MCNC: 421 UG/DL (ref 250–450)
TSH SERPL DL<=0.005 MIU/L-ACNC: 4.42 UIU/ML (ref 0.38–5.33)
VIT B12 SERPL-MCNC: 1281 PG/ML (ref 211–911)
WBC # BLD AUTO: 9.3 K/UL (ref 4.8–10.8)

## 2018-05-02 PROCEDURE — 84480 ASSAY TRIIODOTHYRONINE (T3): CPT

## 2018-05-02 PROCEDURE — 85025 COMPLETE CBC W/AUTO DIFF WBC: CPT

## 2018-05-02 PROCEDURE — 82306 VITAMIN D 25 HYDROXY: CPT

## 2018-05-02 PROCEDURE — 83540 ASSAY OF IRON: CPT

## 2018-05-02 PROCEDURE — 82746 ASSAY OF FOLIC ACID SERUM: CPT

## 2018-05-02 PROCEDURE — 99214 OFFICE O/P EST MOD 30 MIN: CPT | Performed by: FAMILY MEDICINE

## 2018-05-02 PROCEDURE — 36415 COLL VENOUS BLD VENIPUNCTURE: CPT

## 2018-05-02 PROCEDURE — 80053 COMPREHEN METABOLIC PANEL: CPT

## 2018-05-02 PROCEDURE — 83550 IRON BINDING TEST: CPT

## 2018-05-02 PROCEDURE — 82607 VITAMIN B-12: CPT

## 2018-05-02 PROCEDURE — 84443 ASSAY THYROID STIM HORMONE: CPT

## 2018-05-02 PROCEDURE — 84439 ASSAY OF FREE THYROXINE: CPT

## 2018-05-02 RX ORDER — PROMETHAZINE HYDROCHLORIDE 25 MG/1
25 TABLET ORAL EVERY 6 HOURS PRN
Qty: 30 TAB | Refills: 0 | Status: SHIPPED | OUTPATIENT
Start: 2018-05-02

## 2018-05-02 RX ORDER — HYDROMORPHONE HYDROCHLORIDE 4 MG/ML
4 INJECTION, SOLUTION INTRAMUSCULAR; INTRAVENOUS; SUBCUTANEOUS
COMMUNITY

## 2018-05-02 ASSESSMENT — ENCOUNTER SYMPTOMS
NECK PAIN: 0
HEADACHES: 0
CHILLS: 0
HEMOPTYSIS: 0
NEUROLOGICAL NEGATIVE: 1
PALPITATIONS: 0
MYALGIAS: 0
COUGH: 0
RESPIRATORY NEGATIVE: 1
FEVER: 0
PSYCHIATRIC NEGATIVE: 1
CONSTIPATION: 0
DIZZINESS: 0
CARDIOVASCULAR NEGATIVE: 1
ABDOMINAL PAIN: 1
BACK PAIN: 1
EYES NEGATIVE: 1

## 2018-05-02 NOTE — PROGRESS NOTES
Subjective:      Jed Cramer is a 78 y.o. male who presents with Hypertension; GI Problem (nausea daily); and Diarrhea            1. 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  - pramoxine (PROCTOFOAM) 1 % foam; Insert 1 Dose in rectum 4 times a day as needed.  Dispense: 1 Can; Refill: 6  - COMP METABOLIC PANEL; Future  - FREE THYROXINE; Future  - TRIIDOTHYRONINE; Future  - TSH; Future  - VITAMIN D,25 HYDROXY; Future  - CBC WITH DIFFERENTIAL; Future  - IRON/TOTAL IRON BIND; Future  - FOLATE; Future  - VITAMIN B12; Future    2. Combined systolic and diastolic heart failure, unspecified HF chronicity (HCC)  The patient's current medical issue is well controlled on the current therapy with no new symptoms or worsening    - HYDROmorphone (DILAUDID) 4 MG/ML Solution; 4 mg by Intravenous route every 1 hour as needed.  - pramoxine (PROCTOFOAM) 1 % foam; Insert 1 Dose in rectum 4 times a day as needed.  Dispense: 1 Can; Refill: 6  - COMP METABOLIC PANEL; Future  - FREE THYROXINE; Future  - TRIIDOTHYRONINE; Future  - TSH; Future  - VITAMIN D,25 HYDROXY; Future  - CBC WITH DIFFERENTIAL; Future  - IRON/TOTAL IRON BIND; Future  - FOLATE; Future  - VITAMIN B12; Future    3. Malignant neoplasm of urinary bladder, unspecified site (HCC)  Has f/u with urology, no current chemo or radiation  - HYDROmorphone (DILAUDID) 4 MG/ML Solution; 4 mg by Intravenous route every 1 hour as needed.  - pramoxine (PROCTOFOAM) 1 % foam; Insert 1 Dose in rectum 4 times a day as needed.  Dispense: 1 Can; Refill: 6  - COMP METABOLIC PANEL; Future  - FREE THYROXINE; Future  - TRIIDOTHYRONINE; Future  - TSH; Future  - VITAMIN D,25 HYDROXY; Future  - CBC WITH DIFFERENTIAL; Future  - IRON/TOTAL IRON BIND; Future  - FOLATE; Future  - VITAMIN B12; Future    4. Morbid obesity (HCC)    - HYDROmorphone  (DILAUDID) 4 MG/ML Solution; 4 mg by Intravenous route every 1 hour as needed.  - pramoxine (PROCTOFOAM) 1 % foam; Insert 1 Dose in rectum 4 times a day as needed.  Dispense: 1 Can; Refill: 6  - COMP METABOLIC PANEL; Future  - FREE THYROXINE; Future  - TRIIDOTHYRONINE; Future  - TSH; Future  - VITAMIN D,25 HYDROXY; Future  - CBC WITH DIFFERENTIAL; Future  - IRON/TOTAL IRON BIND; Future  - FOLATE; Future  - VITAMIN B12; Future    5. Opioid type dependence, continuous (HCC)  On chronic pain meds from dr. Pena due to chronic spine issues  - HYDROmorphone (DILAUDID) 4 MG/ML Solution; 4 mg by Intravenous route every 1 hour as needed.  - pramoxine (PROCTOFOAM) 1 % foam; Insert 1 Dose in rectum 4 times a day as needed.  Dispense: 1 Can; Refill: 6  - COMP METABOLIC PANEL; Future  - FREE THYROXINE; Future  - TRIIDOTHYRONINE; Future  - TSH; Future  - VITAMIN D,25 HYDROXY; Future  - CBC WITH DIFFERENTIAL; Future  - IRON/TOTAL IRON BIND; Future  - FOLATE; Future  - VITAMIN B12; Future    6. Paroxysmal atrial fibrillation (HCC)  Stable rate on thinners  - HYDROmorphone (DILAUDID) 4 MG/ML Solution; 4 mg by Intravenous route every 1 hour as needed.  - pramoxine (PROCTOFOAM) 1 % foam; Insert 1 Dose in rectum 4 times a day as needed.  Dispense: 1 Can; Refill: 6  - COMP METABOLIC PANEL; Future  - FREE THYROXINE; Future  - TRIIDOTHYRONINE; Future  - TSH; Future  - VITAMIN D,25 HYDROXY; Future  - CBC WITH DIFFERENTIAL; Future  - IRON/TOTAL IRON BIND; Future  - FOLATE; Future  - VITAMIN B12; Future    7. Anal fissure  Likely due to constipation from opiate use  Taking fiber but on exam today, small inferior fissure present with no infection  Will treat with proctofoam to see if this helps and continue with fiber  Pain clinic is trying to wean him down from opiate dosages  - HYDROmorphone (DILAUDID) 4 MG/ML Solution; 4 mg by Intravenous route every 1 hour as needed.  - pramoxine (PROCTOFOAM) 1 % foam; Insert 1 Dose in rectum 4 times a  day as needed.  Dispense: 1 Can; Refill: 6  - COMP METABOLIC PANEL; Future  - FREE THYROXINE; Future  - TRIIDOTHYRONINE; Future  - TSH; Future  - VITAMIN D,25 HYDROXY; Future  - CBC WITH DIFFERENTIAL; Future  - IRON/TOTAL IRON BIND; Future  - FOLATE; Future  - VITAMIN B12; Future    Past Medical History:  No date: A-fib (HCC)  5/8/2012: Anxiety state, unspecified  5/8/2012: Cellulitis and abscess of leg, except foot  No date: Chronic pain syndrome      Comment: Implanted pain pump managed by Dr Shane Pena  No date: Colitis      Comment: Hospitalized 3/2012  No date: CRPS (complex regional pain syndrome)      Comment: Of L leg after surgery 2011  No date: Depression  1970s: Knee joint injury      Comment: Injury in training for [employer]  5/8/2012: 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  5/8/2012: 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  1980s: Leg DVT (deep venous thromboembolism), acute (*  5/8/2012: Lumbago  2010: Malignant neoplasm of bladder, part unspecified      Comment: Radical cystectomy with prostatectomy, T2N0M0                with orthotopic neobladder, 2010  5/8/2012: Obesity, unspecified  5/8/2012: Obstructive sleep apnea (adult) (pediatric)      Comment: unclear if true  5/8/2012: Opioid type dependence, unspecified  No date: Osteoporosis  5/8/2012: Other and unspecified noninfectious gastroente*  5/8/2012: Personal history of urinary calculi  5/8/2012: S/P appendectomy  No date: Stroke ([facility])  Past Surgical History:  11/8/2017: RECTAL EXPLORATION      Comment: Procedure: RECTAL EXPLORATION ADVANCEMENT                FLAP, EXCISION OF PAPILLAE;  Surgeon: Ephraim Piedra M.D.;  Location: SURGERY [facility];  Service: General  2012: PUMP INSERT/REMOVE      Comment: Pain pump  2010: BLADDER BIOPSY  9/2003: ROTATOR CUFF REPAIR      Comment: R side  No date: ANKLE LIGAMENT RECONSTRUCTION      Comment: x 4 surgeries  No date: APPENDECTOMY  No date: ATHROPLASTY       Comment: 23 L  knee surgeries -- 1997 replacement                became infected including MRSA, total knee                reconstruction done 8/2011 with no infection                afterwards but recurrent sx of                pain/swelling/redness -- Dr Chrissy Gray                [phone] - now followed by Dr Ricardo Mead and Dmitriy Treviño in [place]  No date: CARPAL TUNNEL RELEASE  No date: CYSTOPROSTATECTOMY RADICAL      Comment: Orthotopic neobladder construction with Bell                9/2010  No date: TONSILLECTOMY  Smoking status: Current Some Day Smoker                                                    Packs/day: 0.50      Years: 10.00        Types: Cigarettes     Last attempt to quit: 6/26/2016  Smokeless tobacco: Current User                    Comment: chewing tobacco occ  Alcohol use: No              Review of patient's family history indicates:    Cancer                         Mother                      Comment: breast    Heart Attack                   Father                    Heart Disease                  Father                      Current Outpatient Prescriptions: •  HYDROmorphone (DILAUDID) 4 MG/ML Solution, 4 mg by Intravenous route every 1 hour as needed., Disp: , Rfl: •  pramoxine (PROCTOFOAM) 1 % foam, Insert 1 Dose in rectum 4 times a day as needed., Disp: 1 Can, Rfl: 6•  metoprolol (LOPRESSOR) 25 MG Tab, TAKE ONE TABLET BY MOUTH TWICE A DAY, Disp: 60 Tab, Rfl: 1•  furosemide (LASIX) 20 MG Tab, Take 1 Tab by mouth every day., Disp: 30 Tab, Rfl: 1•  amiodarone (CORDARONE) 200 MG Tab, Take 200 mg by mouth every day., Disp: , Rfl: •  PEG 3350-KCl-NaBcb-NaCl-NaSulf 227.1 GM Pack, Take 1 Each by mouth 2 times a day as needed., Disp: 60 Each, Rfl: 3•  omeprazole (PRILOSEC) 40 MG delayed-release capsule, Take 1 Cap by mouth every day., Disp: 30 Cap, Rfl: 3•  buPROPion (WELLBUTRIN) 100 MG Tab, Take 1 Tab by mouth 2 times a day., Disp: 60 Tab, Rfl: 3•  OxyCODONE  HCl ER 15 MG Tablet Extended Release 12 hour Abuse-Deterrent, Take 2 Tabs by mouth 2 times a day as needed. Per PT ER, Disp: , Rfl: •  Pain Pump (PATIENT SUPPLIED) XX ALY, by Injection route Continuous. Unknown except for Dilaudid, Disp: , Rfl: •  hydrocodone-acetaminophen (NORCO) 5-325 MG Tab per tablet, Take 1-2 Tabs by mouth every four hours as needed., Disp: 20 Tab, Rfl: 0•  folic acid (FOLVITE) 1 MG Tab, Take 1 Tab by mouth 2 Times a Day., Disp: 60 Tab, Rfl: 3•  apixaban (ELIQUIS) 5mg Tab, Take 1 Tab by mouth 2 Times a Day., Disp: 60 Tab, Rfl: 11•  quetiapine (SEROQUEL) 50 MG tablet, Take 1 Tab by mouth 2 times a day., Disp: 60 Tab, Rfl: 3•  trazodone (DESYREL) 50 MG Tab, Take 50 mg by mouth every evening., Disp: , Rfl: •  duloxetine (CYMBALTA) 60 MG Cap DR Particles delayed-release capsule, TAKE ONE CAPSULE BY MOUTH DAILY, Disp: 90 Cap, Rfl: 0•  oxycodone immediate-release (ROXICODONE) 5 MG TABS, Take 15 mg by mouth every four hours as needed., Disp: , Rfl:     Patient was instructed on the use of medications, either prescriptions or OTC and informed on when the appropriate follow up time period should be. In addition, patient was also instructed that should any acute worsening occur that they should notify this clinic asap or call 911.            Review of Systems   Constitutional: Positive for malaise/fatigue. Negative for chills and fever.        Past Medical History:  No date: A-fib (Formerly [facility])  5/8/2012: Anxiety state, unspecified  5/8/2012: Cellulitis and abscess of leg, except foot  No date: Chronic pain syndrome      Comment: Implanted pain pump managed by Dr Shane Pena  No date: Colitis      Comment: Hospitalized 3/2012  No date: CRPS (complex regional pain syndrome)      Comment: Of L leg after surgery 2011  No date: Depression  1970s: Knee joint injury      Comment: Injury in training for [employer]  5/8/2012: 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  5/8/2012: Knee joint replacement by other  means  1980s: Leg DVT (deep venous thromboembolism), acute (*  5/8/2012: Lumbago  2010: Malignant neoplasm of bladder, part unspecified      Comment: Radical cystectomy with prostatectomy, T2N0M0                with orthotopic neobladder, 2010 5/8/2012: Obesity, unspecified  5/8/2012: Obstructive sleep apnea (adult) (pediatric)      Comment: unclear if true  5/8/2012: Opioid type dependence, unspecified  No date: Osteoporosis  5/8/2012: Other and unspecified noninfectious gastroente*  5/8/2012: Personal history of urinary calculi  5/8/2012: S/P appendectomy  No date: Stroke (HCC)  Past Surgical History:  11/8/2017: RECTAL EXPLORATION      Comment: Procedure: RECTAL EXPLORATION ADVANCEMENT                FLAP, EXCISION OF PAPILLAE;  Surgeon: Ephraim Piedra M.D.;  Location: SURGERY [facility];  Service: General  2012: PUMP INSERT/REMOVE      Comment: Pain pump  2010: BLADDER BIOPSY  9/2003: ROTATOR CUFF REPAIR      Comment: R side  No date: ANKLE LIGAMENT RECONSTRUCTION      Comment: x 4 surgeries  No date: APPENDECTOMY  No date: ATHROPLASTY      Comment: 23 L  knee surgeries -- 1997 replacement                became infected including MRSA, total knee                reconstruction done 8/2011 with no infection                afterwards but recurrent sx of                pain/swelling/redness -- Dr Chrissy Guo [place]                [phone] - now followed by Dr Riacrdo Mead and Dmitriy Treviño in [place]  No date: CARPAL TUNNEL RELEASE  No date: CYSTOPROSTATECTOMY RADICAL      Comment: Orthotopic neobladder construction with Bell                9/2010  No date: TONSILLECTOMY  Smoking status: Current Some Day Smoker                                                    Packs/day: 0.50      Years: 10.00        Types: Cigarettes     Last attempt to quit: 6/26/2016  Smokeless tobacco: Current User                    Comment: chewing tobacco occ  Alcohol use: No          "     Review of patient's family history indicates:    Cancer                         Mother                      Comment: breast    Heart Attack                   Father                    Heart Disease                  Father                     HENT: Negative.    Eyes: Negative.    Respiratory: Negative.  Negative for cough and hemoptysis.    Cardiovascular: Negative.  Negative for chest pain and palpitations.   Gastrointestinal: Positive for abdominal pain. Negative for constipation.   Genitourinary: Negative.  Negative for dysuria and urgency.   Musculoskeletal: Positive for back pain and joint pain. Negative for myalgias and neck pain.   Skin: Negative.  Negative for rash.   Neurological: Negative.  Negative for dizziness and headaches.   Endo/Heme/Allergies: Negative.    Psychiatric/Behavioral: Negative.  Negative for suicidal ideas.          Objective:     /80   Pulse 71   Temp 37.3 °C (99.1 °F)   Resp 14   Ht 1.778 m (5' 10\")   Wt 103.4 kg (228 lb)   SpO2 99%   BMI 32.71 kg/m²      Physical Exam   Constitutional: He is oriented to person, place, and time. He appears well-developed. No distress.   HENT:   Head: Normocephalic and atraumatic.   Mouth/Throat: Oropharynx is clear and moist. No oropharyngeal exudate.   Eyes: Pupils are equal, round, and reactive to light.   Cardiovascular: Normal rate, regular rhythm, normal heart sounds and intact distal pulses.  Exam reveals no gallop and no friction rub.    No murmur heard.  Pulmonary/Chest: Effort normal and breath sounds normal. No respiratory distress. He has no wheezes. He has no rales. He exhibits no tenderness.   Genitourinary:   Genitourinary Comments: Inferior anal fissure present, no current infection   Musculoskeletal:   Ambulates slowly due to spinal issues   Neurological: He is alert and oriented to person, place, and time.   Skin: He is not diaphoretic.   Psychiatric: He has a normal mood and affect. His behavior is normal. Judgment " and thought content normal.   Nursing note and vitals reviewed.              Assessment/Plan:     1. 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  - pramoxine (PROCTOFOAM) 1 % foam; Insert 1 Dose in rectum 4 times a day as needed.  Dispense: 1 Can; Refill: 6  - COMP METABOLIC PANEL; Future  - FREE THYROXINE; Future  - TRIIDOTHYRONINE; Future  - TSH; Future  - VITAMIN D,25 HYDROXY; Future  - CBC WITH DIFFERENTIAL; Future  - IRON/TOTAL IRON BIND; Future  - FOLATE; Future  - VITAMIN B12; Future    2. Combined systolic and diastolic heart failure, unspecified HF chronicity (HCC)    - HYDROmorphone (DILAUDID) 4 MG/ML Solution; 4 mg by Intravenous route every 1 hour as needed.  - pramoxine (PROCTOFOAM) 1 % foam; Insert 1 Dose in rectum 4 times a day as needed.  Dispense: 1 Can; Refill: 6  - COMP METABOLIC PANEL; Future  - FREE THYROXINE; Future  - TRIIDOTHYRONINE; Future  - TSH; Future  - VITAMIN D,25 HYDROXY; Future  - CBC WITH DIFFERENTIAL; Future  - IRON/TOTAL IRON BIND; Future  - FOLATE; Future  - VITAMIN B12; Future    3. Malignant neoplasm of urinary bladder, unspecified site (HCC)    - HYDROmorphone (DILAUDID) 4 MG/ML Solution; 4 mg by Intravenous route every 1 hour as needed.  - pramoxine (PROCTOFOAM) 1 % foam; Insert 1 Dose in rectum 4 times a day as needed.  Dispense: 1 Can; Refill: 6  - COMP METABOLIC PANEL; Future  - FREE THYROXINE; Future  - TRIIDOTHYRONINE; Future  - TSH; Future  - VITAMIN D,25 HYDROXY; Future  - CBC WITH DIFFERENTIAL; Future  - IRON/TOTAL IRON BIND; Future  - FOLATE; Future  - VITAMIN B12; Future    4. Morbid obesity (HCC)    - HYDROmorphone (DILAUDID) 4 MG/ML Solution; 4 mg by Intravenous route every 1 hour as needed.  - pramoxine (PROCTOFOAM) 1 % foam; Insert 1 Dose in rectum 4 times a day as needed.  Dispense: 1 Can; Refill: 6  - COMP METABOLIC PANEL; Future  - FREE  THYROXINE; Future  - TRIIDOTHYRONINE; Future  - TSH; Future  - VITAMIN D,25 HYDROXY; Future  - CBC WITH DIFFERENTIAL; Future  - IRON/TOTAL IRON BIND; Future  - FOLATE; Future  - VITAMIN B12; Future    5. Opioid type dependence, continuous (HCC)    - HYDROmorphone (DILAUDID) 4 MG/ML Solution; 4 mg by Intravenous route every 1 hour as needed.  - pramoxine (PROCTOFOAM) 1 % foam; Insert 1 Dose in rectum 4 times a day as needed.  Dispense: 1 Can; Refill: 6  - COMP METABOLIC PANEL; Future  - FREE THYROXINE; Future  - TRIIDOTHYRONINE; Future  - TSH; Future  - VITAMIN D,25 HYDROXY; Future  - CBC WITH DIFFERENTIAL; Future  - IRON/TOTAL IRON BIND; Future  - FOLATE; Future  - VITAMIN B12; Future    6. Paroxysmal atrial fibrillation (HCC)    - HYDROmorphone (DILAUDID) 4 MG/ML Solution; 4 mg by Intravenous route every 1 hour as needed.  - pramoxine (PROCTOFOAM) 1 % foam; Insert 1 Dose in rectum 4 times a day as needed.  Dispense: 1 Can; Refill: 6  - COMP METABOLIC PANEL; Future  - FREE THYROXINE; Future  - TRIIDOTHYRONINE; Future  - TSH; Future  - VITAMIN D,25 HYDROXY; Future  - CBC WITH DIFFERENTIAL; Future  - IRON/TOTAL IRON BIND; Future  - FOLATE; Future  - VITAMIN B12; Future    7. Anal fissure    - HYDROmorphone (DILAUDID) 4 MG/ML Solution; 4 mg by Intravenous route every 1 hour as needed.  - pramoxine (PROCTOFOAM) 1 % foam; Insert 1 Dose in rectum 4 times a day as needed.  Dispense: 1 Can; Refill: 6  - COMP METABOLIC PANEL; Future  - FREE THYROXINE; Future  - TRIIDOTHYRONINE; Future  - TSH; Future  - VITAMIN D,25 HYDROXY; Future  - CBC WITH DIFFERENTIAL; Future  - IRON/TOTAL IRON BIND; Future  - FOLATE; Future  - VITAMIN B12; Future

## 2018-05-02 NOTE — PROGRESS NOTES
Target end date:indefinite     Indication: Stroke     Drug: Eliquis 5mg po bid        Health Status Since Last Assessment   Patient denies any new relevant medical problems, ED visits or hospitalizations   Patient denies any embolic events (stroke/tia/systemic embolism)    Adherence with DOAC Therapy   Pt has zero missed any doses in the average week    Bleeding Risk Assessment     none Epistaxis   Pt denies any excessive or unusual bleeding/hematomas.  Pt denies any GI bleeds or hematemesis.  Pt denies any concerning daily headache or sub dural hematoma symptoms.     Pt denies any hematuria or abnormal vaginal bleeding.   Latest Hemoglobin pending   ETOH overuse never     Creatinine Clearance/Renal Function     Latest ClCr ~40 pt will get labs today     Drug Interactions   Platelets: 181   ASA/other antiplatelets ASA - pt will STOP NOW   NSAID none   Other drug interactions none      Verified no anticonvulsant or azole therapy, education provided for future use.     Examination   Blood Pressure 16/70   Symptomatic hypotension YES   Significant gait impairment/imbalance/high risk for falls? YES, last fall within 60 days.  Averages 3-4 falls per year.  STRONGLY ENCOURAGED HIM TO GET TO ED after EACH fall.    Final Assessment and Recommendations:   Patient appears stable from the anticoagulation staindpoint.     Benefits of continued DOAC therapy outweigh risks for this patient   Recommend pt continue with current DOAC therapy   (with dose adjustment) PENDING LABS TODAY.     Other Actions: cmp/ cbc hemogram ordered prior to next visit    Follow up:   Will follow up with patient via telephone in 6 months.  I have added this patient to my list for follow up.    Maria Del Carmen Benoit, PharmD

## 2018-05-03 ENCOUNTER — ANTICOAGULATION MONITORING (OUTPATIENT)
Dept: VASCULAR LAB | Facility: MEDICAL CENTER | Age: 78
End: 2018-05-03

## 2018-05-03 ENCOUNTER — TELEPHONE (OUTPATIENT)
Dept: MEDICAL GROUP | Facility: PHYSICIAN GROUP | Age: 78
End: 2018-05-03

## 2018-05-03 DIAGNOSIS — I63.50 CEREBROVASCULAR ACCIDENT (CVA) DUE TO OCCLUSION OF CEREBRAL ARTERY (HCC): ICD-10-CM

## 2018-05-03 NOTE — TELEPHONE ENCOUNTER
----- Message from Abraham Morrissey M.D. sent at 5/3/2018  1:28 PM PDT -----  Labs show patient is low on vitamin d, they needs to replace this with 2000 units per day otc

## 2018-05-10 ENCOUNTER — DOCUMENTATION (OUTPATIENT)
Dept: MEDICAL GROUP | Facility: PHYSICIAN GROUP | Age: 78
End: 2018-05-10

## 2018-05-10 NOTE — PROGRESS NOTES
"Pt wife called regarding an issue that Dr. Pena brought up to her. He stated the pts leg looks like it has \"cellulitis\" and the wife was wanting to come into the office to have someone look at it before she takes him to the ER. I advised her that it would need to be an official visit and there is no availability until 3pm today. She said never mind and disconnected the phone.  "

## 2018-06-27 ENCOUNTER — ANTICOAGULATION MONITORING (OUTPATIENT)
Dept: VASCULAR LAB | Facility: MEDICAL CENTER | Age: 78
End: 2018-06-27

## 2018-06-27 DIAGNOSIS — I63.50 CEREBROVASCULAR ACCIDENT (CVA) DUE TO OCCLUSION OF CEREBRAL ARTERY (HCC): ICD-10-CM

## 2018-06-27 NOTE — PROGRESS NOTES
Pt presents to [place] office of cardiology for samples  5 weeks given.  Maria Del Carmen Benoit, PharmD

## 2018-06-29 DIAGNOSIS — E66.9 OBESITY (BMI 30-39.9): ICD-10-CM

## 2018-06-29 DIAGNOSIS — R10.13 EPIGASTRIC PAIN: ICD-10-CM

## 2018-06-29 DIAGNOSIS — I63.9 CEREBROVASCULAR ACCIDENT (CVA), UNSPECIFIED MECHANISM (HCC): ICD-10-CM

## 2018-06-29 DIAGNOSIS — G89.4 CHRONIC PAIN SYNDROME: ICD-10-CM

## 2018-06-29 DIAGNOSIS — T40.2X5A CONSTIPATION DUE TO OPIOID THERAPY: ICD-10-CM

## 2018-06-29 DIAGNOSIS — Z85.51 H/O PRIMARY MALIGNANT NEOPLASM OF URINARY BLADDER: ICD-10-CM

## 2018-06-29 DIAGNOSIS — I10 ESSENTIAL HYPERTENSION: ICD-10-CM

## 2018-06-29 DIAGNOSIS — K59.03 CONSTIPATION DUE TO OPIOID THERAPY: ICD-10-CM

## 2018-06-29 NOTE — TELEPHONE ENCOUNTER
Was the patient seen in the last year in this department? Yes     Does patient have an active prescription for medications requested? Yes     Received Request Via: Pharmacy     Last office visit 05/02/18  Last labs 05/02/18

## 2018-07-02 RX ORDER — OMEPRAZOLE 40 MG/1
CAPSULE, DELAYED RELEASE ORAL
Qty: 30 CAP | Refills: 2 | Status: SHIPPED | OUTPATIENT
Start: 2018-07-02

## 2018-12-14 ENCOUNTER — ANTICOAGULATION MONITORING (OUTPATIENT)
Dept: VASCULAR LAB | Facility: MEDICAL CENTER | Age: 78
End: 2018-12-14

## 2018-12-14 DIAGNOSIS — I63.50 CEREBROVASCULAR ACCIDENT (CVA) DUE TO OCCLUSION OF CEREBRAL ARTERY (HCC): ICD-10-CM

## 2018-12-14 NOTE — PROGRESS NOTES
Discharged from [facility].  Pt has   Maria Del Carmen Benoit, Clinical Pharmacist

## 2021-01-11 DIAGNOSIS — Z23 NEED FOR VACCINATION: ICD-10-CM

## (undated) DEVICE — TUBING CLEARLINK DUO-VENT - C-FLO (48EA/CA)

## (undated) DEVICE — JELLY, KY 2 0Z STERILE

## (undated) DEVICE — SUCTION INSTRUMENT YANKAUER BULBOUS TIP W/O VENT (50EA/CA)

## (undated) DEVICE — PACK MINOR BASIN - (2EA/CA)

## (undated) DEVICE — ELECTRODE DUAL RETURN W/ CORD - (50/PK)

## (undated) DEVICE — SET LEADWIRE 5 LEAD BEDSIDE DISPOSABLE ECG (1SET OF 5/EA)

## (undated) DEVICE — SUTURE GENERAL

## (undated) DEVICE — MASK ANESTHESIA ADULT  - (100/CA)

## (undated) DEVICE — SENSOR SPO2 NEO LNCS ADHESIVE (20/BX) SEE USER NOTES

## (undated) DEVICE — CANISTER SUCTION 3000ML MECHANICAL FILTER AUTO SHUTOFF MEDI-VAC NONSTERILE LF DISP  (40EA/CA)

## (undated) DEVICE — TRAY SRGPRP PVP IOD WT PRP - (20/CA)

## (undated) DEVICE — GLOVE, BIOGEL ECLIPSE, SZ 7.0, PF LTX (50/BX)

## (undated) DEVICE — GAUZE FLUFF STERILE 2-PLY 36 X 36 (100EA/CA)

## (undated) DEVICE — SUTURE 2-0 CHROMIC GUT SH 27 (36PK/BX)"

## (undated) DEVICE — KIT ANESTHESIA W/CIRCUIT & 3/LT BAG W/FILTER (20EA/CA)

## (undated) DEVICE — SET EXTENSION WITH 2 PORTS (48EA/CA) ***PART #2C8610 IS A SUBSTITUTE*****

## (undated) DEVICE — NEPTUNE 4 PORT MANIFOLD - (20/PK)

## (undated) DEVICE — MASK, LARYNGEAL AIRWAY #4

## (undated) DEVICE — PROTECTOR ULNA NERVE - (36PR/CA)

## (undated) DEVICE — GLOVE BIOGEL SZ 7.5 SURGICAL PF LTX - (50PR/BX 4BX/CA)

## (undated) DEVICE — ELECTRODE 850 FOAM ADHESIVE - HYDROGEL RADIOTRNSPRNT (50/PK)

## (undated) DEVICE — HEAD HOLDER JUNIOR/ADULT

## (undated) DEVICE — SODIUM CHL IRRIGATION 0.9% 1000ML (12EA/CA)

## (undated) DEVICE — LACTATED RINGERS INJ 1000 ML - (14EA/CA 60CA/PF)

## (undated) DEVICE — KIT ROOM DECONTAMINATION

## (undated) DEVICE — GLOVE BIOGEL PI INDICATOR SZ 7.0 SURGICAL PF LF - (50/BX 4BX/CA)

## (undated) DEVICE — BRIEF STRETCH MATERNITY M/L - FITS 20-60IN (5EA/BG 20BG/CA)

## (undated) DEVICE — GOWN WARMING STANDARD FLEX - (30/CA)